# Patient Record
Sex: FEMALE | Race: WHITE | Employment: FULL TIME | URBAN - METROPOLITAN AREA
[De-identification: names, ages, dates, MRNs, and addresses within clinical notes are randomized per-mention and may not be internally consistent; named-entity substitution may affect disease eponyms.]

---

## 2020-09-18 ENCOUNTER — HOSPITAL ENCOUNTER (OUTPATIENT)
Dept: PHYSICAL THERAPY | Age: 35
Discharge: HOME | End: 2020-09-18
Payer: COMMERCIAL

## 2020-09-18 PROCEDURE — 97162 PT EVAL MOD COMPLEX 30 MIN: CPT

## 2020-09-18 PROCEDURE — 97110 THERAPEUTIC EXERCISES: CPT

## 2020-09-18 NOTE — PROGRESS NOTES
PT DAILY TREATMENT NOTE    Patient Name: Terri Howell  Date:2020  : 1985  [x]  Patient  Verified  Payor: Kim Pena / Plan: Dev Siddiqui RPN / Product Type: Commerical /    In time:10:56  Out time:11:41  Total Treatment Time (min): 45  Total Timed Codes (min): 25  1:1 Treatment Time ([facility] only): 25  Visit #: 1 of 12    Treatment Area: Back pain [M54.9]    SUBJECTIVE  Pain Level (0-10 scale): 5  Any medication changes, allergies to medications, adverse drug reactions, diagnosis change, or new procedure performed?: [x] No    [] Yes (see summary sheet for update)  Subjective functional status/changes:   [] No changes reported  Patient is a 30 y.o. Female who presents to [facility] with a dx of back pain s/p onset of pregancy and is due on 2020. Pt reports  symptoms began 3 months ago. Pt reports numbness and tingling in her back. Symptoms are worsened by prolonged sitting and alleviated by rest.  Average reported pain levels are 5/10, 7/10 at worst, and 4/10 at best. Pt is a mother of a 3year old and is having a boy. 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 Pt would benefit from skilled PT to address objective and functional limitations to improve ROM, strength, posture, and decrease pain to improve ability to perform ADLs at work/home. OBJECTIVE    Modalities Rationale:     decrease edema, decrease inflammation and decrease pain to improve patient's ability to stand for prolonged periods.     min [] Estim, type/location:                                      []  att     []  unatt     []  w/US     []  w/ice    []  w/heat    min []  Mechanical Traction: type/lbs                   []  pro   []  sup   []  int   []  cont    []  before manual    []  after manual    min []  Ultrasound, settings/location:      min []  Iontophoresis w/ dexamethasone, location:                                               []  take home patch []  in clinic    min []  Ice     []  Heat    location/position:     min []  Vasopneumatic Device, press/temp:     min []  Other:    [] Skin assessment post-treatment (if applicable):    []  intact    []  redness- no adverse reaction     []redness  adverse reaction:        20 min []Eval                  []Re-Eval       25 min Therapeutic Exercise:  [] See flow sheet :   Rationale: increase ROM and increase strength to improve the patients ability to stand for prolonged periods. With   [x] TE   [] TA   [] neuro   [] other: Patient Education: [x] Review HEP    [] Progressed/Changed HEP based on:   [] positioning   [] body mechanics   [] transfers   [] heat/ice application    [] other:      Other Objective/Functional Measures:    ROM     Right  Left    SB  3 inches from lateral femoral condlyle 3 inches from lateral femoral condlyle   Ext : 100%       Flex: 4 inches from floor        LTR: unable to test in supine secondary to being in 3rd trimester             MMT     Right Left    Hip flex 4 4+   Hip abd/add 4/4 4+/4+   Knee ext 4 4+   Ankle DF 4 4+   QP          SIJ cluster: unable to formally test, unable to lay supine     Sit to stand: pain with sit to stand improved after left adductor pull back          Pain Level (0-10 scale) post treatment: 4    ASSESSMENT/Changes in Function: Pt has s/sx consistent with sacroiliac joint pain. Patient will continue to benefit from skilled PT services to modify and progress therapeutic interventions, address functional mobility deficits, address ROM deficits and address strength deficits to attain remaining goals. []  See Plan of Care  []  See progress note/recertification  []  See Discharge Summary         Progress towards goals / Updated goals:  1. Patient to be (I) and compliant with Initial HEP to prevent further disability. Eval: dispensed       2.  Pt to increase standing and walking to 30 minutes  to improve functional tolerance  Eval: currently fatigued after 10 minutes     Long term Goals to be achieved in 6 weeks. 1. Pt to be (I) and compliant with progressive HEP in preparation for D/C. Eval: n/a      2. Pt to increase standing and walking to 1 hour  to improve functional tolerance. Eval: currently fatigued after 10 minutes     3.  Pt demonstrate good tolerance to QP positioning to improve lifting tolerance  Eval:challenged in modified QP    PLAN  []  Upgrade activities as tolerated     []  Continue plan of care  []  Update interventions per flow sheet       []  Discharge due to:_  []  Other:_      Carlotta Brooks, PT 9/18/2020  10:38 AM    Future Appointments   Date Time Provider [place]   9/18/2020 11:00 AM Uvaldo Aguirre, PT [facility]

## 2020-09-18 NOTE — PROGRESS NOTES
[facility] at the [address], [place], [address] [facility]  Phone: [phone]      Fax:  [phone]       Plan of Care/ Statement of Necessity for Physical Therapy Services      Patient name: Sushila Calhoun Start of Care: 2020   Referral source: Jarvis Sales : 1985    Medical Diagnosis: Back pain [M54.9]   Onset Date:acute    Treatment Diagnosis: Back pain [M54.9]   Prior Hospitalization: see medical history Provider#: [id number]   Medications: Verified on Patient summary List    Comorbidities: morbid obesity, pregnancy due on 2020, ACL repair left 2015, history of meniscus repair on right knee,    Prior Level of Function: no back pain prior to pregnancy    Patient is a 30 y.o. Female who presents to [facility] with a dx of back pain s/p onset of pregancy and is due on 2020. Pt reports  symptoms began 3 months ago. Pt reports numbness and tingling in her back. Symptoms are worsened by prolonged sitting and alleviated by rest.  Average reported pain levels are 5/10, 7/10 at worst, and 4/10 at best. Pt is a mother of a 3year old and is having a boy. 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 Pt would benefit from skilled PT to address objective and functional limitations to improve ROM, strength, posture, and decrease pain to improve ability to perform ADLs at work/home.      ROM    Right  Left    SB  3 inches from lateral femoral condlyle 3 inches from lateral femoral condlyle   Ext : 100%     Flex: 4 inches from floor      LTR: unable to test in supine secondary to being in 3rd trimester         MMT    Right Left    Hip flex 4 4+   Hip abd/add 4/4 4+/4+   Knee ext 4 4+   Ankle DF 4 4+   QP       SIJ cluster: unable to formally test, unable to lay supine    Sit to stand: pain with sit to stand improved after left adductor pull back      The Plan of Care and following information is based on the information from the initial evaluation. Assessment/ key information: Pt has s/sx consistent with sacroiliac joint pain. Evaluation Complexity History MEDIUM  Complexity : 1-2 comorbidities / personal factors will impact the outcome/ POC ; Examination HIGH Complexity : 4+ Standardized tests and measures addressing body structure, function, activity limitation and / or participation in recreation  ;Presentation MEDIUM Complexity : Evolving with changing characteristics  ; Clinical Decision Making MEDIUM Complexity : FOTO score of 26-74  Overall Complexity Rating: MEDIUM  Problem List: pain affecting function, decrease ROM, decrease strength, edema affecting function, impaired gait/ balance, and decrease ADL/ functional abilitiies   Treatment Plan may include any combination of the following: Therapeutic exercise, Therapeutic activities, Neuromuscular re-education, Physical agent/modality, and Manual therapy  Patient / Family readiness to learn indicated by: asking questions and interest  Persons(s) to be included in education: patient (P)  Barriers to Learning/Limitations: None  Patient Goal (s): get rid of back pain  Patient Self Reported Health Status: good  Rehabilitation Potential: good  Short term goals to be achieved in 3 weeks  1. Patient to be (I) and compliant with Initial HEP to prevent further disability. Eval: dispensed     2. Pt to increase standing and walking to 30 minutes  to improve functional tolerance  Eval: currently fatigued after 10 minutes     Long term Goals to be achieved in 6 weeks. 1. Pt to be (I) and compliant with progressive HEP in preparation for D/C. Eval: n/a     2. Pt to increase standing and walking to 1 hour  to improve functional tolerance. Eval: currently fatigued after 10 minutes    3.  Pt demonstrate good tolerance to QP positioning to improve lifting tolerance  Eval:challenged in modified QP    Frequency / Duration: Patient to be seen 2 times per week for 6 weeks. Patient/ Caregiver education and instruction: Diagnosis, prognosis, activity modification and exercises   [x]  Plan of care has been reviewed with PTA    Charlie Carrion, PT 9/18/2020 10:33 AM  _____________________________________________________________________  I certify that the above Therapy Services are being furnished while the patient is under my care. I agree with the treatment plan and certify that this therapy is necessary.     Physician's Signature:____________Date:_________TIME:________    [employer], Date and Time must be completed for valid certification **    Please sign and return to [facility] at the [address], [address] [facility]       Phone: [phone]      Fax:  [phone]

## 2020-09-25 ENCOUNTER — HOSPITAL ENCOUNTER (OUTPATIENT)
Dept: PHYSICAL THERAPY | Age: 35
Discharge: HOME | End: 2020-09-25
Payer: COMMERCIAL

## 2020-11-16 NOTE — PROGRESS NOTES
[facility] at the [address], [place], [address] [facility]  Phone: [phone]      Fax:  [phone]    Discharge Summary  Patient name: Chase Paredes Start of Care: 2020   Referral source: Christella Merlin* : 1985               Medical Diagnosis: Back pain [M54.9]    Onset Date:acute               Treatment Diagnosis: Back pain [M54.9]   Prior Hospitalization: see medical history Provider#: [id number]   Medications: Verified on Patient summary List    Comorbidities: morbid obesity, pregnancy due on 2020, ACL repair left , history of meniscus repair on right knee,    Prior Level of Function: no back pain prior to pregnancy    Visits from Start of Care: 1    Missed Visits: 0  1. Patient to be (I) and compliant with Initial HEP to prevent further disability. Eval: dispensed       2. Pt to increase standing and walking to 30 minutes  to improve functional tolerance  Eval: currently fatigued after 10 minutes     Long term Goals to be achieved in 6 weeks.   1. Pt to be (I) and compliant with progressive HEP in preparation for D/C. Eval: n/a      2. Pt to increase standing and walking to 1 hour  to improve functional tolerance. Eval: currently fatigued after 10 minutes     3. Pt demonstrate good tolerance to QP positioning to improve lifting tolerance  Eval:challenged in modified QP    Assessment/ Summary of Care: Pt only attended initial evaluation and is to be d/c'd at this time.    RECOMMENDATIONS:  [x]Discontinue therapy: []Patient has reached or is progressing toward set goals      []Patient is non-compliant or has abdicated      []Due to lack of appreciable progress towards set goals    Casi Wheat, PT 2020 1:54 PM

## 2021-11-11 ENCOUNTER — HOSPITAL ENCOUNTER (OUTPATIENT)
Dept: PHYSICAL THERAPY | Age: 36
Discharge: HOME | End: 2021-11-11
Payer: COMMERCIAL

## 2021-11-11 PROCEDURE — 97162 PT EVAL MOD COMPLEX 30 MIN: CPT

## 2021-11-11 PROCEDURE — 97110 THERAPEUTIC EXERCISES: CPT

## 2021-11-11 PROCEDURE — 97530 THERAPEUTIC ACTIVITIES: CPT

## 2021-11-11 NOTE — PROGRESS NOTES
PT DAILY TREATMENT NOTE/Knee Evaluation    Patient Name: Ramiro Dinero  Date:2021  : 1985  [x]  Patient  Verified  Payor: Leandro Lopez / Plan: Twan REAL / Product Type: Commerical /    In time:11:32  Out time:12:15  Total Treatment Time (min): 43  Total Timed Codes (min): 18  1:1 Treatment Time ( only): 18  Visit #: 1 of 16    Treatment Area: Pain in right knee [M25.561]    SUBJECTIVE  Any medication changes, allergies to medications, adverse drug reactions, diagnosis change, or new procedure performed?: [x] No    [] Yes (see summary sheet for update)  Hx Present Illness: Subjective: Pt reports that in August she slipped and fell on some water in kitchen. Reports that she ended up on the floor with left leg pulled back and the right knee straighten out. Reports has history of surgery in  for meniscus repair on the right and left ACL surgery and meniscal repair. Reports that she did have immediate swelling and had to wear a brace for a while. Reports went to [facility] Castillo who performed X-ray and stated nothing torn but possible strain. Reports MD ordered therapy, but she is just now starting therapy. Reports that she doesn't have strength in knees. Reports that she has had some buckling and the knee locks. Reports that she has some popping as well.      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, walking long distance, standing for 2 hrs    Previous treatment:   PT after surgeries    Co-morbidities: PMHx/Surgical Hx: [x]DM [] HTN (controlled) [] High cholesterol (controlled) [] Cancer [x]Other Right meniscal surgery, left: ACL and meniscal surgery, asthma,   Prior Level of Function:  functionally independent, no AD, walking for exercise   Social/Recreation/Work: Work Hx:   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    Diagnostic Testing:  [] Lab work [x] X-rays    [] CT [] MRI     [] Other:  Results: negative    Patient Goal(s): \"to get more movement, get legs strengthened\"    Barriers: [] none []pain []financial []time []transportation []other  Motivation: good  Substance use:[x] none  []Alcohol []Tobacco []other:   Cognition: A & O x 3    Objective:    Pt enters gym in no apparent distress    Posture: [] Poor    [x] Fair    [] Good    Describe: wide base of support, Right toe out, genu recurvatum R>left, decreased longitudinal arches, rounded sh, forward head, dowagers hump, increased lordosis, left>right rear foot eversion, left>right genu valgus    Outcome measure: FOTO  Score=38    Movement/gait:  [] Normal     [x] Abnormal:  Wide base of support, toe out, dec heel strike, dec toe off, unequal step length    Patellar Positioning (Static)   []Left []Right Normal []Left []Right Lateral   [x]Left [x]Right Wyvonne Pretty      []Left []Right Medial   []Left []Right [place]      Edema:   (centimeters) Right Left   2\" above mid patella     Mid patella 47 50   2\" below mid patella         Palpation: TTP:  TTP, right medial femoral condyle, right knee lateral joint line, feels tight on the lateral right knee    Patellar Mobility: WFL bilaterally []Left  []Right Hypermobile []Left []Right Hypomobile                           Hip Alignment: NT        ROM:  [] Unable to assess at this time     AROM             Knee Left Right   Extension 0 Lacking 7   Flexion 130 130         AROM                        Hip Left Right   Flexion 73 75 Extension     ABD     ER     IR       Strength:   [] Unable to assess at this time    Left (0-5) Right (0-5) N/T   Knee extension 4 5- []   Knee flexion 4+ 5- []   Ankle Dorsiflexion (L4) 4+ 4+ []   Ankle Plantarflexion (S1)  #heel raises   [x]   Hip flexion 4 4 []   Hip abduction 4- 4 []   Hip ADDuction   [x]   Hip ER 4 4- []   Hip IR 4 4 []   Hip extension 4- 4- []   Glute Max 3+ 3+ []   Glute Med   [x]              Balance: SLS: Left: 1\" Right: 2\"      Squat: inc anterior tibial translation, inc pronation, grinding audible on the right, dec lumbar flexion    Single leg squat:  NT    Neuro Screen [x] WNL Intact to light touch in bilateral LE.   Myotome/Dermatome/Reflexes:  Comments:    Special Test:  Knee  Left Right   Varus Stress Test  neg   Valgus Stress Test  neg   Lachman's  neg   Apprehension     Deshaun's  negative   Apley's test  Pain on the outside of the knee   Thessaly  negative   Posterior Sag     Patellar Grind  positive   Single Leg Squat     Anterior Drawer  negative   Single leg sit to stand     Khalif test ([employer])     Noble Compression test ([employer])       Hip Left Right   Gume Test     Emile Oglesby     90-90 20 23     25 min []Eval                  []Re-Eval       10 min Therapeutic Exercise:  [x] See flow sheet :   Rationale: increase ROM and increase strength to improve the patients ability to perform daily activities with decreased pain and symptom levels     8 min Therapeutic Activity:  [x]  See flow sheet :   Rationale: assess ROM and assess strength  to improve the patients ability to perform daily activities with decreased pain and symptom levels            With   [x] TE   [x] TA   [] neuro   [] other: Patient Education: [x] Review HEP    [] Progressed/Changed HEP based on:   [] positioning   [] body mechanics   [] transfers   [] heat/ice application    [x] other:  Educated pt on proper shoe ware and provided handout, educated pt on anatomy       Pain Level (0-10 scale) post treatment: 0/10    Assessment/ brody information:  Trell Hernandez is a 29 y.o.  yo female who [facility] to [facility] diagnosed with plica syndrome of the right knee. Patient is s/p fall this past August where she slipped on water and landed with left knee flexed behind her and right knee extended. Reports initially having swelling and required to wear a knee brace. Pt states that the knee kiet and locks sometimes and has some grinding and popping as well. Pt went to Dr. Kim Butler who stated possible strain as X-rays were negative and referred to PT. 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 Pt has relief with rest, Motrin, and moving it more. Pt rates pain 0/10 currently at rest and 2/10 at worst in the last 48 hrs (9/10 at the time of injury), and 0/10 at least. Patient demonstrates decreased ROM, decreased strength, impaired posture, impaired balance, and decreased functional mobility tolerance. Pt is tender to palpate right medial femoral condyle, right knee lateral joint line, reports it feels tight on the lateral right knee. Pt also presents with impaired squatting techniques. Pt with positive special test including Apley's compression test and patellar grinding. Pt signs and symptoms are consistent with possible meniscal involvement, as well as pelvic dysfunction. Pt would benefit from skilled physical therapy to address these limitations.     Patient will benefit from skilled PT services to modify and progress therapeutic interventions, address functional mobility deficits, address ROM deficits, address strength deficits, analyze and address soft tissue restrictions, analyze and cue movement patterns, analyze and modify body mechanics/ergonomics, assess and modify postural abnormalities, and address imbalance/dizziness to attain remaining goals. [x]  See Plan of Care  []  See progress note/recertification  []  See Discharge Summary    Evaluation Complexity History HIGH Complexity :3+ comorbidities / personal factors will impact the outcome/ POC ; Examination HIGH Complexity : 4+ Standardized tests and measures addressing body structure, function, activity limitation and / or participation in recreation  ;Presentation MEDIUM Complexity : Evolving with changing characteristics  ; Clinical Decision Making MEDIUM Complexity : FOTO score of 26-74 FOTO score = an established functional score where 100 = no disability  Overall Complexity Rating: MEDIUM  Problem List: pain affecting function, decrease ROM, decrease strength, edema affecting function, impaired gait/ balance, decrease ADL/ functional abilitiies, decrease activity tolerance, decrease flexibility/ joint mobility and decrease transfer abilities     Treatment Plan may include any combination of the following: Therapeutic exercise, Therapeutic activities, Neuromuscular re-education, Physical agent/modality, Gait/balance training, Manual therapy, Patient education, Self Care training, Functional mobility training, Home safety training and Stair training  Vasopnuematic compression justification:  Per bilateral girth measures taken and listed above the edema is considered significant and having an impact on the patient's n/a  Patient / Family readiness to learn indicated by: asking questions, trying to perform skills and interest  Persons(s) to be included in education: patient (P)  Barriers to Learning/Limitations: None  Patient Goal (s): \"to get more movement, get legs strengthened\"  Patient Self Reported Health Status: poor  Rehabilitation Potential: good    Short Term Goals: To be accomplished in 2 weeks: 1. Patient will report compliance with HEP 1x/day to aid in rehabilitation program.  Status at IE: Provided pt with HEP and pt appeared to understand. Current:Same as IE     2. Patient will rate pain on greater than 4/10 so pt can perform ADL's. Status at IE: Pt rates pain 0/10 currently at rest and 2/10 at worst in the last 48 hrs (9/10 at the time of injury), and 0/10 at least  Current: Same as IE    Long Term Goals: To be accomplished in 8 weeks: 1. Patient will increase limited Bilateral LE strength by at least . 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 Status at IE:   Left (0-5) Right (0-5) N/T   Knee extension 4 5- []   Knee flexion 4+ 5- []   Ankle Dorsiflexion (L4) 4+ 4+ []   Ankle Plantarflexion (S1)  #heel raises   [x]   Hip flexion 4 4 []   Hip abduction 4- 4 []   Hip ADDuction   [x]   Hip ER 4 4- []   Hip IR 4 4 []   Hip extension 4- 4- []   Glute Max 3+ 3+ []    Current: Same as IE    2. Patient will report pain no greater than 1-2/10 throughout entire day to aid in completion of ADLs. Status at IE:See STG  Current: Same as IE    3. 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 Status at IE:   Knee Left Right   Extension 0 Lacking 7   Flexion 130 130   Current: Same as IE    4. Patient will improve FOTO score to 63 points to demonstrate improvement in functional status. Status at IE:38  Current: Same as IE    *FOTO score is an established functional score where 100 = no disability*    5. Pt will be able to stand on one leg for 5 seconds to assist with shifting weight to one LE with gait. Status at IE: Balance: SLS: Left: 1\" Right: 2\"    Current: Same as IE    Frequency / Duration: Patient to be seen 2 times per week for 8  weeks.     PLAN  [x]  Upgrade activities as tolerated     []  Continue plan of care  [x]  Update interventions per flow sheet       []  Discharge due to:_  []  Other:_        Joanna Willson, PT, DPT, [facility] 11/11/2021  9:51 AM

## 2021-11-11 NOTE — PROGRESS NOTES
[facility] at the [address], [place], [address] [facility]  Phone: [phone]      Fax:  [phone]             Plan of Care/ Statement of Necessity for Physical Therapy Services      Patient name: Carolyn Rivera Start of Care: 2021   Referral source: Quynh Bynum MD : 1985    Medical Diagnosis: Pain in right knee [M25.561]   Onset Date:2021    Treatment Diagnosis: Right knee pain   Prior Hospitalization: see medical history Provider#: [id number]   Medications: Verified on Patient summary List    Co-morbidities: PMHx/Surgical Hx: [x]? DM []? HTN (controlled) []? High cholesterol (controlled) []? Cancer [x]? Other Right meniscal surgery, left: ACL and meniscal surgery, asthma,   Prior Level of Function:  functionally independent, no AD, walking for exercise       The Plan of Care and following information is based on the information from the initial evaluation. Assessment/ key information: Corby Duff is a 29 y.o.  yo female who [facility] to [facility] diagnosed with plica syndrome of the right knee. Patient is s/p fall this past August where she slipped on water and landed with left knee flexed behind her and right knee extended. Reports initially having swelling and required to wear a knee brace. Pt states that the knee kiet and locks sometimes and has some grinding and popping as well. Pt went to Dr. Nancy Mckay who stated possible strain as X-rays were negative and referred to PT. 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 Pt has relief with rest, Motrin, and moving it more.  Pt rates pain 0/10 currently at rest and 2/10 at worst in the last 48 hrs (9/10 at the time of injury), and 0/10 at least. Patient demonstrates decreased ROM, decreased strength, impaired posture, impaired balance, and decreased functional mobility tolerance. Pt is tender to palpate right medial femoral condyle, right knee lateral joint line, reports it feels tight on the lateral right knee. Pt also presents with impaired squatting techniques. Pt with positive special test including Apley's compression test and patellar grinding. Pt signs and symptoms are consistent with possible meniscal involvement, as well as pelvic dysfunction.  Pt would benefit from skilled physical therapy to address these limitations.     Patient will benefit from skilled PT services to modify and progress therapeutic interventions, address functional mobility deficits, address ROM deficits, address strength deficits, analyze and address soft tissue restrictions, analyze and cue movement patterns, analyze and modify body mechanics/ergonomics, assess and modify postural abnormalities, and address imbalance/dizziness to attain remaining goals.          Evaluation Complexity History HIGH Complexity :3+ comorbidities / personal factors will impact the outcome/ POC ; Examination HIGH Complexity : 4+ Standardized tests and measures addressing body structure, function, activity limitation and / or participation in recreation  ;Presentation MEDIUM Complexity : Evolving with changing characteristics  ;Clinical Decision Making MEDIUM Complexity : FOTO score of 26-74 FOTO score = an established functional score where 100 = no disability  Overall Complexity Rating: MEDIUM  Problem List: pain affecting function, decrease ROM, decrease strength, edema affecting function, impaired gait/ balance, decrease ADL/ functional abilitiies, decrease activity tolerance, decrease flexibility/ joint mobility and decrease transfer abilities     Treatment Plan may include any combination of the following: Therapeutic exercise, Therapeutic activities, Neuromuscular re-education, Physical agent/modality, Gait/balance training, Manual therapy, Patient education, Self Care training, Functional mobility training, Home safety training and Stair training  Vasopnuematic compression justification:  Per bilateral girth measures taken and listed above the edema is considered significant and having an impact on the patient's n/a  Patient / Family readiness to learn indicated by: asking questions, trying to perform skills and interest  Persons(s) to be included in education: patient (P)  Barriers to Learning/Limitations: None  Patient Goal (s): \"to get more movement, get legs strengthened\"  Patient Self Reported Health Status: poor  Rehabilitation Potential: good     Short Term Goals: To be accomplished in 2 weeks: 1. Patient will report compliance with HEP 1x/day to aid in rehabilitation program.  Status at IE: Provided pt with HEP and pt appeared to understand. Current:Same as IE     2. Patient will rate pain on greater than 4/10 so pt can perform ADL's. Status at IE: Pt rates pain 0/10 currently at rest and 2/10 at worst in the last 48 hrs (9/10 at the time of injury), and 0/10 at least  Current: Same as IE     Long Term Goals: To be accomplished in 8 weeks: 1. Patient will increase limited Bilateral LE strength by at least . 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    Knee flexion 4+ 5- []?    Ankle Dorsiflexion (L4) 4+ 4+ []?    Ankle Plantarflexion (S1)  #heel raises     [x]?    Hip flexion 4 4 []?    Hip abduction 4- 4 []?    Hip ADDuction     [x]?    Hip ER 4 4- []?    Hip IR 4 4 []?    Hip extension 4- 4- []?    Glute Max 3+ 3+ []?     Current: Same as IE     2.Patient will report pain no greater than 1-2/10 throughout entire day to aid in completion of ADLs. Status at IE:See STG  Current: Same as IE     3.  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 Status at IE:   Knee Left Right   Extension 0 Lacking 7   Flexion 130 130   Current: Same as IE     4.Patient will improve FOTO score to 63 points to demonstrate improvement in functional status. Status at IE:38  Current: Same as IE     *FOTO score is an established functional score where 100 = no disability*     5. Pt will be able to stand on one leg for 5 seconds to assist with shifting weight to one LE with gait. Status at IE: Balance: SLS: Left: 1\" Right: 2\"    Current: Same as IE     Frequency / Duration: Patient to be seen 2 times per week for 8  weeks. Patient/ Caregiver education and instruction: Diagnosis, prognosis, self care, activity modification and exercises Educated pt on proper shoe ware and provided handout, educated pt on anatomy   [x]  Plan of care has been reviewed with PTA    Certification Period: n/a  Brittany Steele PT, DPT, CIMT 11/11/2021 9:52 AM  _____________________________________________________________________  I certify that the above Therapy Services are being furnished while the patient is under my care. I agree with the treatment plan and certify that this therapy is necessary.     [de-identified] Signature:____________Date:_________TIME:________                                      Brionna Fish MD    ** Signature, Date and Time must be completed for valid certification **    Please sign and return to [facility] at the [address], [facility], [address] Counts include 234 beds at the [facility]       Phone: [phone]      Fax:  [phone]

## 2021-11-17 ENCOUNTER — APPOINTMENT (OUTPATIENT)
Dept: PHYSICAL THERAPY | Age: 36
End: 2021-11-17
Payer: COMMERCIAL

## 2021-11-22 ENCOUNTER — HOSPITAL ENCOUNTER (OUTPATIENT)
Dept: PHYSICAL THERAPY | Age: 36
Discharge: HOME | End: 2021-11-22
Payer: COMMERCIAL

## 2021-11-22 PROCEDURE — 97110 THERAPEUTIC EXERCISES: CPT

## 2021-11-22 PROCEDURE — 97112 NEUROMUSCULAR REEDUCATION: CPT

## 2021-11-22 PROCEDURE — 97530 THERAPEUTIC ACTIVITIES: CPT

## 2021-11-22 NOTE — PROGRESS NOTES
PT DAILY TREATMENT NOTE    Patient Name: Rosemarie Stephenson  Date:2021  : 1985  [x]  Patient  Verified  Payor: Justine Side / Plan: Deja Talley RPN / Product Type: Commerical /    In time:2:20  Out time:3:13  Total Treatment Time (min): 53  Total Timed Codes (min): 43  1:1 Treatment Time (MC/BCBS only): 43   Visit #: 2 of 16    Treatment Dx: Pain in right knee [M25.561]    SUBJECTIVE  Pain Level (0-10 scale): 6/10  Any medication changes, allergies to medications, adverse drug reactions, diagnosis change, or new procedure performed?: [] No    [x] Yes (see summary sheet for update)- Pt reports that she is not allergic Prednisone  Subjective functional status/changes:   [] No changes reported  Pt reports that she is feeling it in the calf muscle more. 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 Reports that she is doing HEP 1x/day. 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 Reports she forgot to wear tennis shoes today (pt wearing boots today).      OBJECTIVE pt enters gym in no apparent distress    Modalities Rationale:     decrease edema, decrease inflammation and decrease pain to improve patient's ability to perform daily activities with decreased pain and symptom levels     min [] Estim, type/location:                                      []  att     []  unatt     []  w/US     []  w/ice    []  w/heat    min []  Mechanical Traction: type/lbs                   []  pro   []  sup   []  int   []  cont    []  before manual    []  after manual    min []  Ultrasound, settings/location:      min []  Iontophoresis w/ dexamethasone, location:                                               []  take home patch       []  in clinic   10 min [x]  Ice     []  Heat    location/position: Right knee, pt reclined with bolster under legs    min []  Vasopneumatic Device, press/temp:    If using vaso (only need to measure limb vaso being performed on)      pre-treatment girth :       post-treatment girth :       measured at (landmark location) :      min []  Other:    [] Skin assessment post-treatment (if applicable):    []  intact    []  redness- no adverse reaction                  []redness  adverse reaction:            23 min Therapeutic Exercise:  [] See flow sheet :   Rationale: increase ROM, increase strength, improve coordination, improve balance and increase proprioception to improve the patients ability to perform daily activities with decreased pain and symptom levels      10 min Therapeutic Activity:  []  See flow sheet :   Rationale: increase ROM, increase strength, improve coordination, improve balance and increase proprioception  to improve the patients ability to perform daily activities with decreased pain and symptom levels       10 min Neuromuscular Re-education:  []  See flow sheet :   Rationale: increase ROM, increase strength, improve coordination, improve balance and increase proprioception  to improve the patients ability to perform daily activities with decreased pain and symptom levels            With   [x] TE   [x] TA   [x] neuro   [] other: Patient Education: [x] Review HEP    [] Progressed/Changed HEP based on:   [x] positioning   [x] body mechanics   [x] transfers   [] heat/ice application    [x] other: proper shoe ware for therapy, proper gait     Other Objective/Functional Measures: please see below     Pain Level (0-10 scale) post treatment: \"better\"    ASSESSMENT/Changes in Function: Patient tolerated therapy session well as there were no adverse reactions today. Pt had a hard time with performing PPT and required tactile cuing. Pt required tactile cuing to keep PPT with sidelying glut max and adductor pull back. Pt is progressing with therapy as indicated by pt tolerating increase in exercise repetitions and resistance.  Although showing progress patient would benefit from continuation of skilled physical therapy to address the remaining limitations. Patient will continue to benefit from skilled PT services to modify and progress therapeutic interventions, address functional mobility deficits, address ROM deficits, address strength deficits, analyze and address soft tissue restrictions, analyze and cue movement patterns, analyze and modify body mechanics/ergonomics, assess and modify postural abnormalities, address imbalance/dizziness and instruct in home and community integration to attain remaining goals. [x]  See Plan of Care  []  See progress note/recertification  []  See Discharge Summary         Progress towards goals / Updated goals:  Short Term Goals:   To be accomplished in 2 weeks: 1. Patient will report compliance with HEP 1x/day to aid in rehabilitation program.  Status at IE: Provided pt with HEP and pt appeared to understand. Current:11/22/21 Pt reports she is doing HEP 1x/week for 1 week     2.Patient will rate pain on greater than 4/10 so pt can perform ADL's. Status at IE: Pt rates pain 0/10 currently at rest and 2/10 at worst in the last 48 hrs (9/10 at the time of injury), and 0/10 at least  Current: 11/22/21 rates pain 6/10 this therapy session-progressing     Long Term Goals: To be accomplished in 8 weeks: 1. Patient will increase limited Bilateral LE strength by at least . 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    Knee flexion 4+ 5- []??    Ankle Dorsiflexion (L4) 4+ 4+ []? ?    Ankle Plantarflexion (S1)  #heel raises     [x]? ?    Hip flexion 4 4 []? ?    Hip abduction 4- 4 []? ?    Hip ADDuction     [x]? ?    Hip ER 4 4- []??    Hip IR 4 4 []? ?    Hip extension 4- 4- []??    Glute Max 3+ 3+ []? ?     Current: Same as IE     2.Patient will report pain no greater than 1-2/10 throughout entire day to aid in completion of ADLs.   Status at IE:See STG  Current: Same as IE     3. 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 Status at IE:   Knee Left Right   Extension 0 Lacking 7   Flexion 130 130   Current: Same as IE     4.Patient will improve FOTO score to 63 points to demonstrate improvement in functional status. Status at IE:38  Current: 11/22/21 will perform on 5th visit     *FOTO score is an established functional score where 100 = no disability*     5. Pt will be able to stand on one leg for 5 seconds to assist with shifting weight to one LE with gait.   Status at IE: Balance: SLS: Left: 1\" Right: 2\"    Current: Same as IE        PLAN  [x]  Upgrade activities as tolerated     [x]  Continue plan of care  []  Update interventions per flow sheet       []  Discharge due to:_  []  Other:_      Tiffany Yusuf, PT, DPT, CIMT 11/22/2021  10:54 AM    Future Appointments   Date Time Provider [place]   11/22/2021  2:15 PM Wayne Perfect, PT [facility]   11/24/2021  1:30 PM Wayne Perfect, PT [facility]   12/1/2021  2:15 PM Wayne Perfect, PT [facility]   12/3/2021 10:45 AM Wayne Perfect, PT [facility]   12/8/2021  2:15 PM Wayne Perfect, PT [facility]   12/10/2021 11:30 AM Wayne Perfect, PT [facility]   12/13/2021 10:45 AM Wayne Perfect, PT [facility]   12/15/2021 10:45 AM Wayne Perfect, PT [facility]   12/21/2021  9:15 AM Wayne Perfect, PT [facility]   12/23/2021 10:45 AM Wayne Perfect, PT [facility]

## 2021-11-24 ENCOUNTER — HOSPITAL ENCOUNTER (OUTPATIENT)
Dept: PHYSICAL THERAPY | Age: 36
End: 2021-11-24
Payer: COMMERCIAL

## 2021-12-01 ENCOUNTER — APPOINTMENT (OUTPATIENT)
Dept: PHYSICAL THERAPY | Age: 36
End: 2021-12-01

## 2021-12-08 ENCOUNTER — HOSPITAL ENCOUNTER (OUTPATIENT)
Dept: PHYSICAL THERAPY | Age: 36
End: 2021-12-08

## 2021-12-10 ENCOUNTER — HOSPITAL ENCOUNTER (OUTPATIENT)
Dept: PHYSICAL THERAPY | Age: 36
Discharge: HOME | End: 2021-12-10

## 2021-12-10 NOTE — PROGRESS NOTES
[facility]  [address], [place], [address]  Tel. [phone]  Fax: [phone]    Physical Therapy Discharge Summary    Patient Name: Sanjay Morgan : 1985   Treatment/Medical Diagnosis: Pain in right knee [M25.561]   Referral Source: Aylin Walters MD     Date of Initial Visit: 21 Attended Visits: 2 Missed Visits: 6       SUMMARY OF TREATMENT  Pt attended only initial evaluation and     1     follow-ups with the last attended visit on 21 and then did not return. Therefore a formal reassessment of goals was not performed. Therapist called pt on 12/10/21 and pt stated now was not a good time for therapy as she is busy with work and caring for her son. Pt requested to be d/c'd at this time. Educated pt that if she needs to return then we would need a new order from the MD and perform another evaluation. RECOMMENDATIONS  Discontinue physical therapy due to patient not returning. Pt shall remain under care of MD.      If you have any questions/comments please contact us directly at [phone]. Thank you for allowing us to assist in the care of your patient.     Therapist Signature: Kai Toledo PT, DPT, CIMT Date: 12/10/21     Time: 1:22 PM

## 2021-12-13 ENCOUNTER — APPOINTMENT (OUTPATIENT)
Dept: PHYSICAL THERAPY | Age: 36
End: 2021-12-13

## 2021-12-15 ENCOUNTER — APPOINTMENT (OUTPATIENT)
Dept: PHYSICAL THERAPY | Age: 36
End: 2021-12-15

## 2021-12-21 ENCOUNTER — APPOINTMENT (OUTPATIENT)
Dept: PHYSICAL THERAPY | Age: 36
End: 2021-12-21

## 2021-12-23 ENCOUNTER — APPOINTMENT (OUTPATIENT)
Dept: PHYSICAL THERAPY | Age: 36
End: 2021-12-23

## 2024-04-22 ENCOUNTER — OFFICE VISIT (OUTPATIENT)
Age: 39
End: 2024-04-22

## 2024-04-22 ENCOUNTER — HOSPITAL ENCOUNTER (OUTPATIENT)
Facility: HOSPITAL | Age: 39
Setting detail: SPECIMEN
Discharge: HOME | End: 2024-04-25

## 2024-04-22 VITALS
DIASTOLIC BLOOD PRESSURE: 86 MMHG | TEMPERATURE: 97.5 F | SYSTOLIC BLOOD PRESSURE: 138 MMHG | BODY MASS INDEX: 48.82 KG/M2 | WEIGHT: 293 LBS | HEIGHT: 65 IN | HEART RATE: 70 BPM | RESPIRATION RATE: 18 BRPM | OXYGEN SATURATION: 96 %

## 2024-04-22 DIAGNOSIS — E66.01 MORBID OBESITY (HCC): Primary | ICD-10-CM

## 2024-04-22 DIAGNOSIS — N32.81 OVERACTIVE BLADDER: ICD-10-CM

## 2024-04-22 DIAGNOSIS — E28.2 PCOS (POLYCYSTIC OVARIAN SYNDROME): ICD-10-CM

## 2024-04-22 DIAGNOSIS — Z71.89 ENCOUNTER FOR PRE-BARIATRIC SURGERY COUNSELING AND EDUCATION: ICD-10-CM

## 2024-04-22 DIAGNOSIS — F41.1 GAD (GENERALIZED ANXIETY DISORDER): ICD-10-CM

## 2024-04-22 DIAGNOSIS — E11.65 TYPE 2 DIABETES MELLITUS WITH HYPERGLYCEMIA, WITHOUT LONG-TERM CURRENT USE OF INSULIN (HCC): ICD-10-CM

## 2024-04-22 DIAGNOSIS — K21.9 GASTROESOPHAGEAL REFLUX DISEASE, UNSPECIFIED WHETHER ESOPHAGITIS PRESENT: ICD-10-CM

## 2024-04-22 LAB — LABCORP SPECIMEN COLLECTION: NORMAL

## 2024-04-22 PROCEDURE — 99001 SPECIMEN HANDLING PT-LAB: CPT

## 2024-04-22 PROCEDURE — 99204 OFFICE O/P NEW MOD 45 MIN: CPT | Performed by: SURGERY

## 2024-04-22 RX ORDER — SEMAGLUTIDE 2.68 MG/ML
1 INJECTION, SOLUTION SUBCUTANEOUS
COMMUNITY
Start: 2024-01-16

## 2024-04-22 RX ORDER — ALBUTEROL SULFATE 90 UG/1
2 AEROSOL, METERED RESPIRATORY (INHALATION)
COMMUNITY
Start: 2022-04-04

## 2024-04-22 RX ORDER — TRAZODONE HYDROCHLORIDE 100 MG/1
100 TABLET ORAL NIGHTLY PRN
COMMUNITY
Start: 2023-10-11

## 2024-04-22 RX ORDER — FERROUS SULFATE 325(65) MG
325 TABLET ORAL DAILY
COMMUNITY
Start: 2024-03-21

## 2024-04-22 RX ORDER — MELOXICAM 15 MG/1
15 TABLET ORAL DAILY
COMMUNITY
Start: 2024-04-18

## 2024-04-22 NOTE — PROGRESS NOTES
Bariatric Surgery Consultation    CC: Consultation regarding surgical treatment options for morbid obesity and related comorbidities  Subjective:     The patient is a 38 y.o. obese  female with a Body mass index is 56.25 kg/m².. They have been considering surgery for some time now. The patient presents to the clinic today to discuss surgical weight loss options. They have made multiple attempts at weight loss over the years without success. They have tried low-carb, low calorie, high-protein diets, ozempic. Unfortunately, none of these have lead to meaningful, sustained weight loss. They have attended the bariatric seminar before the visit.     DIET:   See intake form for diet recall    Denies nausea, vomiting, dysphagia  Rare reflux    EXERCISE:   Walking regimen, see intake form    STOP-BANG score:  3    Cancer Screenings:  No cervical cancer screening on file   No breast cancer screening on file   No colonoscopy on file  No cologuard on file  No FIT/FOBT on file   No flexible sigmoidoscopy on file     Labs:   No results found for: \"WBC\", \"HGB\", \"HCT\", \"MCV\", \"PLT\"   No results found for: \"NA\", \"K\", \"CL\", \"CO2\", \"BUN\", \"CREATININE\", \"GLUCOSE\", \"CALCIUM\", \"PROT\", \"LABALBU\", \"BILITOT\", \"ALKPHOS\", \"AST\", \"ALT\", \"LABGLOM\", \"GFRAA\", \"AGRATIO\", \"GLOB\"   No results found for: \"IRON\", \"TIBC\", \"FERRITIN\"   No results found for: \"TSH\", \"TSHREFLEX\", \"TSHFT4\", \"TSHELE\", \"YFY5VBH\", \"TSHHS\"   No results found for: \"LABA1C\"   No results found for: \"LTG9TVHX\"   No results found for: \"VITD25\"    No results found for: \"FMFPSEVU72\"   No results found for: \"AGRP7MBHGOJ\"  No results found for: \"CHOL\", \"TRIG\", \"HDL\", \"LDLCHOLESTEROL\", \"LDLCALC\", \"VLDL\", \"CHOLHDLRATIO\"           Patient Active Problem List    Diagnosis Date Noted    NEHEMIAS (generalized anxiety disorder) 07/14/2021    Type 2 diabetes mellitus with hyperglycemia, without long-term current use of insulin (HCC) 07/14/2021    PCOS (polycystic ovarian syndrome)

## 2024-04-22 NOTE — PROGRESS NOTES
Chief Complaint   Patient presents with    Surgical Consult     Confirmed video    Pt ID confirmed        4/22/2024    10:34 AM   Ambulatory Bariatric Summary   Systolic 154   Diastolic 86   Temp 97.5 °F (36.4 °C)   Respirations 18   Weight - Scale 338   Height 1.651 m (5' 5\")   BMI 56.4 kg/m2   Weight - Scale 153.3 kg (338 lb)   BMI (Calculated) 56.4       Body mass index is 56.25 kg/m².

## 2024-04-25 LAB — UREA BREATH TEST QL: NEGATIVE

## 2024-04-26 LAB — SPECIMEN STATUS REPORT: NORMAL

## 2024-05-01 ENCOUNTER — HOSPITAL ENCOUNTER (OUTPATIENT)
Facility: HOSPITAL | Age: 39
Discharge: HOME | End: 2024-05-04

## 2024-05-01 ENCOUNTER — CLINICAL DOCUMENTATION (OUTPATIENT)
Facility: HOSPITAL | Age: 39
End: 2024-05-01

## 2024-05-01 NOTE — PROGRESS NOTES
[facility]  [address], [address]    Patient's Name: Genevieve Castillo   Age: 38 y.o.  YOB: 1985   Sex: female                Session: 1 of  3   Surgeon:  Dr. Cooper    Height: 5 f 5   Weight:    338      Lbs.     BMI: 56   Pounds Lost since last month: 0                 Pounds Gained since last month: 0    Starting Weight: 338     Previous Month’s Weight: 338  Overall Pounds Lost: 0   Overall Pounds Gained: 0    Patient has not been to support group.    Do you smoke: None    Alcohol intake:  Number of drinks at a time:  None  Number of times a week: None    Class Guidelines    Guidelines are reviewed with patient at the start of every class.    1. Patient understands that weight loss trial classes must be consecutive.  Patient understands if they miss a class, it is their responsibility to contact me to reschedule class.  I will reach out to patient after their first no show.  2.  Patient understands the expectations that weight maintenance/weight loss is expected during the classes.  Failure to demonstrate changes may result in one extra month of weight loss trial, followed by going back to see the surgeon.  Patient understands that they CAN NOT gain any weight during the weight loss trial.  Gaining weight will result in extra classes.  3. Patient is also instructed to be doing their labs, blood work, psych visit, support group and any other test that the surgeon has used while they are working on their weight loss trial.  4.  Patient was instructed to bring their blue binder to every class and appointment.      Other Pertinent Information:     Changes Made Since Last Class: Trying to add in more protein    Eating Habits and Behaviors      Today in class we talked about the key diet principles.  We start off each class talking about these principles, which include cutting out liquid calories and focusing on water or other

## 2024-05-28 ENCOUNTER — HOSPITAL ENCOUNTER (OUTPATIENT)
Facility: HOSPITAL | Age: 39
Discharge: HOME | End: 2024-05-31
Attending: SURGERY
Payer: COMMERCIAL

## 2024-05-28 ENCOUNTER — HOSPITAL ENCOUNTER (OUTPATIENT)
Facility: HOSPITAL | Age: 39
Discharge: HOME | End: 2024-05-31

## 2024-05-28 DIAGNOSIS — Z71.89 ENCOUNTER FOR PRE-BARIATRIC SURGERY COUNSELING AND EDUCATION: ICD-10-CM

## 2024-05-28 DIAGNOSIS — E11.65 TYPE 2 DIABETES MELLITUS WITH HYPERGLYCEMIA, WITHOUT LONG-TERM CURRENT USE OF INSULIN (HCC): ICD-10-CM

## 2024-05-28 DIAGNOSIS — E66.01 MORBID OBESITY (HCC): ICD-10-CM

## 2024-05-28 DIAGNOSIS — K21.9 GASTROESOPHAGEAL REFLUX DISEASE, UNSPECIFIED WHETHER ESOPHAGITIS PRESENT: ICD-10-CM

## 2024-05-28 DIAGNOSIS — F41.1 GAD (GENERALIZED ANXIETY DISORDER): ICD-10-CM

## 2024-05-28 DIAGNOSIS — N32.81 OVERACTIVE BLADDER: ICD-10-CM

## 2024-05-28 DIAGNOSIS — E28.2 PCOS (POLYCYSTIC OVARIAN SYNDROME): ICD-10-CM

## 2024-05-28 LAB
EKG ATRIAL RATE: 69 BPM
EKG DIAGNOSIS: NORMAL
EKG P AXIS: 0 DEGREES
EKG P-R INTERVAL: 154 MS
EKG Q-T INTERVAL: 406 MS
EKG QRS DURATION: 88 MS
EKG QTC CALCULATION (BAZETT): 435 MS
EKG R AXIS: 63 DEGREES
EKG T AXIS: 21 DEGREES
EKG VENTRICULAR RATE: 69 BPM
SENTARA SPECIMEN COLLECTION: NORMAL

## 2024-05-28 PROCEDURE — 99001 SPECIMEN HANDLING PT-LAB: CPT

## 2024-05-28 PROCEDURE — 6370000000 HC RX 637 (ALT 250 FOR IP): Performed by: SURGERY

## 2024-05-28 PROCEDURE — 2500000003 HC RX 250 WO HCPCS: Performed by: SURGERY

## 2024-05-28 PROCEDURE — 93005 ELECTROCARDIOGRAM TRACING: CPT

## 2024-05-28 PROCEDURE — 74246 X-RAY XM UPR GI TRC 2CNTRST: CPT

## 2024-05-28 RX ADMIN — ANTACID/ANTIFLATULENT 1 EACH: 380; 550; 10; 10 GRANULE, EFFERVESCENT ORAL at 09:57

## 2024-05-28 RX ADMIN — BARIUM SULFATE 135 ML: 980 POWDER, FOR SUSPENSION ORAL at 09:56

## 2024-05-28 RX ADMIN — BARIUM SULFATE 1 TABLET: 700 TABLET ORAL at 09:57

## 2024-05-28 RX ADMIN — BARIUM SULFATE 176 G: 960 POWDER, FOR SUSPENSION ORAL at 09:56

## 2024-05-29 LAB
A/G RATIO: 1.8 RATIO (ref 1.1–2.6)
ALBUMIN: 4.3 G/DL (ref 3.5–5)
ALP BLD-CCNC: 82 U/L (ref 25–115)
ALT SERPL-CCNC: 22 U/L (ref 5–40)
ANION GAP SERPL CALCULATED.3IONS-SCNC: 14 MMOL/L (ref 3–15)
AST SERPL-CCNC: 15 U/L (ref 10–37)
BASOPHILS ABSOLUTE: 0.1 K/UL (ref 0–0.2)
BASOPHILS RELATIVE PERCENT: 1 % (ref 0–2)
BILIRUB SERPL-MCNC: 0.2 MG/DL (ref 0.2–1.2)
BUN BLDV-MCNC: 18 MG/DL (ref 6–22)
CALCIUM SERPL-MCNC: 9.4 MG/DL (ref 8.4–10.5)
CHLORIDE BLD-SCNC: 99 MMOL/L (ref 98–110)
CO2: 25 MMOL/L (ref 20–32)
CREAT SERPL-MCNC: 0.6 MG/DL (ref 0.5–1.2)
EOSINOPHIL # BLD: 3 % (ref 0–6)
EOSINOPHILS ABSOLUTE: 0.3 K/UL (ref 0–0.5)
FOLATE: 5.2 NG/ML
GFR, ESTIMATED: >60 ML/MIN/1.73 SQ.M.
GLOBULIN: 2.4 G/DL (ref 2–4)
GLUCOSE: 98 MG/DL (ref 70–99)
HCT VFR BLD CALC: 37.8 % (ref 35.1–46.5)
HEMOGLOBIN: 11.6 G/DL (ref 11.7–15.5)
LYMPHOCYTES # BLD: 29 % (ref 20–45)
LYMPHOCYTES ABSOLUTE: 2.5 K/UL (ref 1–4.8)
MCH RBC QN AUTO: 29 PG (ref 26–34)
MCHC RBC AUTO-ENTMCNC: 31 G/DL (ref 31–36)
MCV RBC AUTO: 94 FL (ref 80–99)
MONOCYTES ABSOLUTE: 0.5 K/UL (ref 0.1–1)
MONOCYTES: 6 % (ref 3–12)
NEUTROPHILS ABSOLUTE: 5.3 K/UL (ref 1.8–7.7)
NEUTROPHILS: 62 % (ref 40–75)
PDW BLD-RTO: 14.7 % (ref 10–15.5)
PLATELET # BLD: 427 K/UL (ref 140–440)
PMV BLD AUTO: 11 FL (ref 9–13)
POTASSIUM SERPL-SCNC: 4.8 MMOL/L (ref 3.5–5.5)
RBC # BLD: 4.04 M/UL (ref 3.8–5.2)
SODIUM BLD-SCNC: 138 MMOL/L (ref 133–145)
TOTAL PROTEIN: 6.7 G/DL (ref 6.4–8.3)
TSH SERPL DL<=0.05 MIU/L-ACNC: 1.63 MCU/ML (ref 0.27–4.2)
VITAMIN B-12: 464 PG/ML (ref 211–911)
VITAMIN D 25-HYDROXY: 21.9 NG/ML (ref 32–100)
WBC # BLD: 8.6 K/UL (ref 4–11)

## 2024-05-31 ENCOUNTER — TELEPHONE (OUTPATIENT)
Age: 39
End: 2024-05-31

## 2024-05-31 NOTE — TELEPHONE ENCOUNTER
----- Message from Kenji Cooper MD sent at 5/31/2024  7:55 AM EDT -----  Please notify patient of vitamin D deficiency/insufficiency and that I sent a script to the pharmacy on file. Thank you!

## 2024-06-01 LAB — THIAMINE BLOOD: 118 NMOL/L (ref 78–185)

## 2024-06-05 ENCOUNTER — CLINICAL DOCUMENTATION (OUTPATIENT)
Facility: HOSPITAL | Age: 39
End: 2024-06-05

## 2024-06-05 ENCOUNTER — HOSPITAL ENCOUNTER (OUTPATIENT)
Facility: HOSPITAL | Age: 39
Discharge: HOME | End: 2024-06-08

## 2024-06-05 NOTE — PROGRESS NOTES
[facility]  [address], [address]    Patient's Name: Genevieve Castillo     Age: 38 y.o.  YOB: 1985     Sex: female                Session: 2 of  3   Surgeon:  Dr. Cooper    Height: 5 f 5   Weight:    341      Lbs.     BMI:    Pounds Lost since last month: 0                 Pounds Gained since last month: 3    Starting Weight: 338     Previous Month’s Weight: 338  Overall Pounds Lost: 0   Overall Pounds Gained: 3    Patient has been to support group.    Do you nicotine products:  None    Alcohol intake:  Number of drinks at a time:  None  Number of times a week: None      Class Guidelines  Office Use Only:  IP  Guidelines are reviewed with patient at the start of every class.    1. Patient understands that weight loss trial classes must be consecutive.  Patient understands if they have 3 no shows including in person and virtual visits, they will be removed from the program.  2.  Patient understands the expectations that weight maintenance/weight loss is expected during the classes.  Failure to demonstrate changes may result in one extra month of weight loss trial, followed by a visit with the nurse practitioner.  3. Patient is also instructed to be doing their labs, blood work, psych visit, support group and any other test that the surgeon has ordered while they are working on their weight loss trial.    Other Pertinent Information:     Changes Made Since Last Class: Adding in a protein drink in the morning    Eating Habits and Behaviors      Today in class we talked about the key diet principles.  We start off each class talking about these principles, which include cutting out liquid calories and focusing on water or other non-calorie, non-carbonated drinks.  We also spent time talking about carbohydrates, including foods that have carbohydrates and the goal to keep daily carbohydrates under 100 grams per day.  Patient was given

## 2024-06-13 ENCOUNTER — OFFICE VISIT (OUTPATIENT)
Age: 39
End: 2024-06-13

## 2024-06-13 VITALS
TEMPERATURE: 97.9 F | WEIGHT: 293 LBS | HEART RATE: 89 BPM | DIASTOLIC BLOOD PRESSURE: 87 MMHG | SYSTOLIC BLOOD PRESSURE: 133 MMHG | OXYGEN SATURATION: 99 % | HEIGHT: 65 IN | RESPIRATION RATE: 18 BRPM | BODY MASS INDEX: 48.82 KG/M2

## 2024-06-13 DIAGNOSIS — E66.01 MORBID OBESITY (HCC): ICD-10-CM

## 2024-06-13 DIAGNOSIS — Z71.89 ENCOUNTER FOR PRE-BARIATRIC SURGERY COUNSELING AND EDUCATION: Primary | ICD-10-CM

## 2024-06-13 RX ORDER — SEMAGLUTIDE 1.34 MG/ML
INJECTION, SOLUTION SUBCUTANEOUS
COMMUNITY
Start: 2024-04-22 | End: 2024-06-15

## 2024-06-13 RX ORDER — BUTALBITAL, ACETAMINOPHEN AND CAFFEINE 50; 325; 40 MG/1; MG/1; MG/1
1 TABLET ORAL EVERY 4 HOURS PRN
COMMUNITY
Start: 2024-05-14

## 2024-06-13 RX ORDER — AMOXICILLIN 500 MG/1
CAPSULE ORAL
COMMUNITY
Start: 2024-06-11

## 2024-06-13 RX ORDER — CHOLECALCIFEROL (VITAMIN D3) 1250 MCG
CAPSULE ORAL
COMMUNITY
Start: 2024-05-31

## 2024-06-13 RX ORDER — SEMAGLUTIDE 0.68 MG/ML
INJECTION, SOLUTION SUBCUTANEOUS
COMMUNITY
Start: 2024-05-14

## 2024-06-13 NOTE — PROGRESS NOTES
progressing through the bariatric preoperative evaluation. At this time, she is an appropriate candidate for weight loss surgery pending below requirements.    Plan:   -Complete remainder of preop evaluation including WLT classes  -Patient communicates understanding that the expectation is to lose or maintain weight during WLT.  Weight gain may result in delay or cancellation of surgery.   -Follow up once she has completed entirety of weight loss workup to determine next steps.      PÉREZ Aguirre - NP

## 2024-07-10 ENCOUNTER — HOSPITAL ENCOUNTER (OUTPATIENT)
Facility: HOSPITAL | Age: 39
Discharge: HOME | End: 2024-07-13

## 2024-07-10 ENCOUNTER — CLINICAL DOCUMENTATION (OUTPATIENT)
Facility: HOSPITAL | Age: 39
End: 2024-07-10

## 2024-07-10 NOTE — PROGRESS NOTES
Nutrition Evaluation    Patient's Name: Genevieve Castillo   Age: 38 y.o.  YOB: 1985   Sex: female    Height: 5 f 5   Starting Weight:  335  Weight: 338   BMI:            Smoking Status:  None  Alcohol Intake:  Number of Drinks at a Time: None  Number of Times a Week: None    Changes made during classes include:  Adding in more protein, less carbohydrates  Drinking mor water each day        Summary:  I feel that Genevieve Castillo has demonstrated appropriate diet changes and is ready to move forward with surgery.  Patient has been briefed on the importance of the protein drinks, vitamins, and the transition of the diet stages. Patient understands that the long-term diet will focus on protein and vegetables.  Patient understand the effects of carbohydrates after surgery and what reactive hypoglycemia is.      Patient is aware that they will be attending pre-op class 2 weeks before surgery and will get more detailed information on the post-op diet guidelines.    Patient will see me again at 6 weeks post-op. At this 6 week visit, RD will assess how patient is tolerating soft protein and advance to vegetables, if tolerating soft protein without difficulty.  Patient will also see RD again at 9 months post-op.  This visit will assess patient's compliance with current protocol, including diet, vitamins, protein shakes, and exercise.  Post-op diet guidelines will be reinforced.  RD is available for questions and to meet with patient outside of the 6 week and 9 month post-op visit.     We spent a lot of time talking about the vitamins.  Patient understands the importance of being compliant with the diet protocol and the complications and risks that can occur if they are non-compliant with the nutritional protocol.     Patient has attended at least one support group.    Candidate for surgery: Yes  Re-evaluation Date:     Procedure:  Gastric Sleeve    Muriel Anaya, RAHUL    7/10/2024

## 2024-07-10 NOTE — PROGRESS NOTES
[facility]  [address], [address]    Patient's Name: Genevieve Castillo     Age: 38 y.o.  YOB: 1985     Sex: female    Date:   7/10/2024          Session: 3 of  3  Surgeon:  Dr. Cooper    Height: 5 f 5   Weight:    335      Lbs.     BMI:    Pounds Lost since last month: 6                 Pounds Gained since last month: 0    Starting Weight: 338     Previous Month’s Weight: 341  Overall Pounds Lost: 3   Overall Pounds Gained: 0      Do you smoke?  None    Alcohol intake:  Number of drinks at a time:  None  Number of times a week: None    Patient has been to a support group.  Class Guidelines  Office Use Only: IP    Guidelines are reviewed with patient at the start of every class.    1. Patient understands that weight loss trial classes must be consecutive.  Patient understands if they miss a class, it is their responsibility to contact me to reschedule class.  I will reach out to patient after their first no show.  2.  Patient understands the expectations that weight maintenance/weight loss is expected during the classes.  Failure to demonstrate changes may result in one extra month of weight loss trial, followed by going back to see the surgeon.  Patient understands that they CAN NOT gain any weight during the weight loss trial.  Gaining weight will result in extra classes.  3. Patient is also instructed to be doing their labs, blood work, psych visit, support group and any other test that the surgeon has used while they are working on their weight loss trial.  4.  Patient was instructed to bring their blue binder to every class and appointment.      Other Pertinent Information:     Changes Made Since Last Class: More protein, less carbohydrates    Eating Habits and Behaviors    Today in class, we started talking about the key diet principles.  We first focused on stopping liquid calories.  Patient was also educated on

## 2024-07-16 ENCOUNTER — PREP FOR PROCEDURE (OUTPATIENT)
Age: 39
End: 2024-07-16

## 2024-07-16 ENCOUNTER — OFFICE VISIT (OUTPATIENT)
Age: 39
End: 2024-07-16

## 2024-07-16 VITALS
TEMPERATURE: 98 F | HEIGHT: 65 IN | HEART RATE: 64 BPM | SYSTOLIC BLOOD PRESSURE: 122 MMHG | BODY MASS INDEX: 48.82 KG/M2 | WEIGHT: 293 LBS | DIASTOLIC BLOOD PRESSURE: 83 MMHG | RESPIRATION RATE: 18 BRPM | OXYGEN SATURATION: 99 %

## 2024-07-16 DIAGNOSIS — E28.2 PCOS (POLYCYSTIC OVARIAN SYNDROME): ICD-10-CM

## 2024-07-16 DIAGNOSIS — E66.01 MORBID OBESITY (HCC): Primary | ICD-10-CM

## 2024-07-16 DIAGNOSIS — Z71.89 OTHER SPECIFIED COUNSELING: ICD-10-CM

## 2024-07-16 DIAGNOSIS — Z71.89 ENCOUNTER FOR PRE-BARIATRIC SURGERY COUNSELING AND EDUCATION: ICD-10-CM

## 2024-07-16 DIAGNOSIS — E11.65 TYPE 2 DIABETES MELLITUS WITH HYPERGLYCEMIA, WITHOUT LONG-TERM CURRENT USE OF INSULIN (HCC): ICD-10-CM

## 2024-07-16 DIAGNOSIS — N32.81 OVERACTIVE BLADDER: ICD-10-CM

## 2024-07-16 PROCEDURE — 99214 OFFICE O/P EST MOD 30 MIN: CPT | Performed by: SURGERY

## 2024-07-16 NOTE — PROGRESS NOTES
Bariatric Surgery Progress Note    CC: Preop appointment for bariatric surgery  Subjective:     Patient presents for a preop appointment for bariatric surgery having completed the insurance-mandated and clinically-relevant clearances/counseling.     REVIEW:     Lab review:   CMP normal  Liver profile normal  Glucose normal  TSH normal  Vit D low, 21.9- on replacement therapy  CBC largely unremarkable  Folate normal  B12 normal  B1 normal  A1c 6.1%    Nutrition: Finished all required nutrition sessions and cleared by dietitian    Psych: Completed mandatory pre-bariatric surgery psychological evaluation and cleared by psychology service    Attended support group    PCP: Clearance letter/note in chart    EGD / UGI reviewed / issues:   UGI 2024: Normal upper GI series     Denies nausea, vomiting, dysphagia  Rare reflux    Previous relevant surgeries:  x2 via pf    Patient Active Problem List    Diagnosis Date Noted    NEHEIMAS (generalized anxiety disorder) 2021    Type 2 diabetes mellitus with hyperglycemia, without long-term current use of insulin (HCC) 2021    PCOS (polycystic ovarian syndrome) 2018    Overactive bladder     Arthritic-like pain     Morbid obesity with body mass index of 50.0-59.9 in adult (HCC)     Morbid obesity (HCC)     Uses birth control     Infertility associated with anovulation     Sprain of cruciate ligament of knee 2015      Past Medical History:   Diagnosis Date    Arthritic-like pain     Infertility associated with anovulation     In the past    Morbid obesity (HCC)     Morbid obesity with body mass index of 50.0-59.9 in adult (HCC)     Overactive bladder     Type 2 diabetes mellitus without complication (HCC)     Uses birth control      Past Surgical History:   Procedure Laterality Date     SECTION      x2 via pf    HEENT      wisdom teeth extracted    ORTHOPEDIC SURGERY Bilateral     meniscus and ACl repairs      Social History     Tobacco

## 2024-07-16 NOTE — H&P (VIEW-ONLY)
Bariatric Surgery Progress Note    CC: Preop appointment for bariatric surgery  Subjective:     Patient presents for a preop appointment for bariatric surgery having completed the insurance-mandated and clinically-relevant clearances/counseling.     REVIEW:     Lab review:   CMP normal  Liver profile normal  Glucose normal  TSH normal  Vit D low, 21.9- on replacement therapy  CBC largely unremarkable  Folate normal  B12 normal  B1 normal  A1c 6.1%    Nutrition: Finished all required nutrition sessions and cleared by dietitian    Psych: Completed mandatory pre-bariatric surgery psychological evaluation and cleared by psychology service    Attended support group    PCP: Clearance letter/note in chart    EGD / UGI reviewed / issues:   UGI 2024: Normal upper GI series     Denies nausea, vomiting, dysphagia  Rare reflux    Previous relevant surgeries:  x2 via pf    Patient Active Problem List    Diagnosis Date Noted    NEHEMIAS (generalized anxiety disorder) 2021    Type 2 diabetes mellitus with hyperglycemia, without long-term current use of insulin (HCC) 2021    PCOS (polycystic ovarian syndrome) 2018    Overactive bladder     Arthritic-like pain     Morbid obesity with body mass index of 50.0-59.9 in adult (HCC)     Morbid obesity (HCC)     Uses birth control     Infertility associated with anovulation     Sprain of cruciate ligament of knee 2015      Past Medical History:   Diagnosis Date    Arthritic-like pain     Infertility associated with anovulation     In the past    Morbid obesity (HCC)     Morbid obesity with body mass index of 50.0-59.9 in adult (HCC)     Overactive bladder     Type 2 diabetes mellitus without complication (HCC)     Uses birth control      Past Surgical History:   Procedure Laterality Date     SECTION      x2 via pf    HEENT      wisdom teeth extracted    ORTHOPEDIC SURGERY Bilateral     meniscus and ACl repairs      Social History     Tobacco

## 2024-07-22 NOTE — PERIOP NOTE
Instructions for your surgery at [facility]      Today's Date:  7/22/2024      Patient's Name:  Genevieve Castillo           Surgery Date:  7/31              Please enter the main entrance of the hospital and check-in at the front security desk located in the lobby. They will direct you to the area to report for your surgery.     Do NOT eat or drink anything, including candy, gum, or ice chips after midnight prior to your surgery, unless you have specific instructions from your surgeon or anesthesia provider to do so.  Brush your teeth before coming to the hospital. You may swish with water, but do not swallow.  No smoking/Vaping/E-Cigarettes 24 hours prior to the day of surgery.  No alcohol 24 hours prior to the day of surgery.  No recreational drugs for one week prior to the day of surgery.  Bring Photo ID, Insurance information, and Co-pay if required on day of surgery.  Bring in pertinent legal documents, such as, Medical Power of , DNR, Advance Directive, etc.  Leave all valuables, including money/purse, at home.  Remove all jewelry, including ALL body piercings, nail polish, acrylic nails, and makeup (including mascara); no lotions, powders, deodorant, or perfume/cologne/after shave on the skin.  Follow instruction for Hibiclens washes and CHG wipes from surgeon's office.   Glasses and dentures may be worn to the hospital. They must be removed prior to surgery. Please bring case/container for glasses or dentures.   Contact lenses should not be worn on day of surgery.   Call your doctor's office if symptoms of a cold or illness develop within 24-48 hours prior to your surgery.  Call your doctor's office if you have any questions concerning insurance or co-pays.  15. AN ADULT (relative or friend 18 years or older) MUST DRIVE YOU HOME AFTER YOUR SURGERY.  16. Please make arrangements for a responsible adult (18 years or older) to be with you for 24 hours after your surgery.   17. TWO

## 2024-07-23 ENCOUNTER — FOLLOWUP TELEPHONE ENCOUNTER (OUTPATIENT)
Facility: HOSPITAL | Age: 39
End: 2024-07-23

## 2024-07-23 ENCOUNTER — CLINICAL DOCUMENTATION (OUTPATIENT)
Facility: HOSPITAL | Age: 39
End: 2024-07-23

## 2024-07-23 ENCOUNTER — HOSPITAL ENCOUNTER (OUTPATIENT)
Facility: HOSPITAL | Age: 39
Discharge: HOME | End: 2024-07-26

## 2024-07-23 NOTE — TELEPHONE ENCOUNTER
Gastric Bypass Instruct patient to read and understand how their surgery works.  The laparoscopic Gayle-en-Y Gastric Bypass -- often called gastric  bypass -- is considered the ‘gold standard’ of weight loss surgery.  The procedure:  The gastric bypass is one of the most frequently performed weight  loss procedures in the [place]. In this procedure, stapling  creates a small (15 to 20 milliliters) stomach pouch. The remainder  of the stomach is not removed but is completely stapled shut and divided from the stomach  pouch. The outlet from this newly formed pouch empties directly into the lower portion of the  small intestine, thus bypassing calorie absorption. This is done by dividing the small intestine just  beyond the duodenum for the purpose of bringing it up and constructing a connection with the  newly formed stomach pouch. The other end is connected into the side of the Gayle limb of the  intestine creating the “Y” shape that gives the technique its name. The length of either segment of  the intestine can be increased to produce lower or higher levels of malabsorption.  Most importantly, the rerouting of the food stream produces changes in gut hormones that  promote feeling of fullness, suppress hunger, and reverse one of the primary mechanisms by which  obesity induces Type 2 diabetes.  Advantages:   The average excess weight loss after the gastric bypass procedure is generally higher in a  compliant patient than with purely restrictive procedures.   One year after surgery, weight loss can average 60 to 80 percent of excess body weight.   Studies show that after 10 to 14 years, 50 to 60 percent of excess body weight loss has been  maintained by some patients.   A 2000 study of 500 patients showed that 96 percent of associated health conditions (back  pain, sleep apnea, high blood pressure, diabetes and depression) were improved or resolved.  Disadvantages:   Because the duodenum is bypassed, poor

## 2024-07-23 NOTE — PROGRESS NOTES
occur if they are non-compliant with the nutritional protocol and non-compliant with the vitamins.     Patient has also been educated on the pre-op liquid diet for 1 week.   Patient understands that failure to comply in pre-op liquid diet could result in surgery being canceled.    Patient's 6 week post-op nutrition appointment has been scheduled.   At this 6 week visit, RD will assess how patient is tolerating soft protein and advance to vegetables, if tolerating soft protein without difficulty.  Patient will also see RD again at 9 months post-op.  This visit will assess patient's compliance with current protocol, including diet, vitamins, protein shakes, and exercise.  Post-op diet guidelines will be reinforced.  RD is available for questions and to meet with patient outside of the 6 week and 9 month post-op visit.    Ok to proceed.     Candidate for surgery: Yes  Re-evaluation Date:     Muriel Anaya RD    7/23/2024

## 2024-07-30 ENCOUNTER — ANESTHESIA EVENT (OUTPATIENT)
Facility: HOSPITAL | Age: 39
End: 2024-07-30
Payer: COMMERCIAL

## 2024-07-30 ENCOUNTER — TELEPHONE (OUTPATIENT)
Age: 39
End: 2024-07-30

## 2024-07-31 ENCOUNTER — HOSPITAL ENCOUNTER (INPATIENT)
Facility: HOSPITAL | Age: 39
LOS: 1 days | Discharge: HOME | End: 2024-08-01
Attending: SURGERY | Admitting: SURGERY
Payer: COMMERCIAL

## 2024-07-31 ENCOUNTER — ANESTHESIA (OUTPATIENT)
Facility: HOSPITAL | Age: 39
End: 2024-07-31
Payer: COMMERCIAL

## 2024-07-31 DIAGNOSIS — G89.18 ACUTE POST-OPERATIVE PAIN: Primary | ICD-10-CM

## 2024-07-31 PROBLEM — E66.01 MORBID (SEVERE) OBESITY DUE TO EXCESS CALORIES (HCC): Status: ACTIVE | Noted: 2024-07-31

## 2024-07-31 LAB
ABO + RH BLD: NORMAL
BLOOD GROUP ANTIBODIES SERPL: NORMAL
GLUCOSE BLD STRIP.AUTO-MCNC: 162 MG/DL (ref 70–110)
GLUCOSE BLD STRIP.AUTO-MCNC: 168 MG/DL (ref 70–110)
GLUCOSE BLD STRIP.AUTO-MCNC: 198 MG/DL (ref 70–110)
GLUCOSE BLD STRIP.AUTO-MCNC: 208 MG/DL (ref 70–110)
HCG UR QL: NEGATIVE
SPECIMEN EXP DATE BLD: NORMAL

## 2024-07-31 PROCEDURE — 6360000002 HC RX W HCPCS: Performed by: NURSE ANESTHETIST, CERTIFIED REGISTERED

## 2024-07-31 PROCEDURE — 2709999900 HC NON-CHARGEABLE SUPPLY: Performed by: SURGERY

## 2024-07-31 PROCEDURE — 2500000003 HC RX 250 WO HCPCS: Performed by: NURSE ANESTHETIST, CERTIFIED REGISTERED

## 2024-07-31 PROCEDURE — S2900 ROBOTIC SURGICAL SYSTEM: HCPCS | Performed by: SURGERY

## 2024-07-31 PROCEDURE — 6360000002 HC RX W HCPCS: Performed by: SURGERY

## 2024-07-31 PROCEDURE — 94761 N-INVAS EAR/PLS OXIMETRY MLT: CPT

## 2024-07-31 PROCEDURE — 2580000003 HC RX 258: Performed by: SURGERY

## 2024-07-31 PROCEDURE — 7100000001 HC PACU RECOVERY - ADDTL 15 MIN: Performed by: SURGERY

## 2024-07-31 PROCEDURE — 7100000000 HC PACU RECOVERY - FIRST 15 MIN: Performed by: SURGERY

## 2024-07-31 PROCEDURE — 86901 BLOOD TYPING SEROLOGIC RH(D): CPT

## 2024-07-31 PROCEDURE — 86900 BLOOD TYPING SEROLOGIC ABO: CPT

## 2024-07-31 PROCEDURE — 86850 RBC ANTIBODY SCREEN: CPT

## 2024-07-31 PROCEDURE — 43775 LAP SLEEVE GASTRECTOMY: CPT | Performed by: SURGERY

## 2024-07-31 PROCEDURE — A4217 STERILE WATER/SALINE, 500 ML: HCPCS | Performed by: SURGERY

## 2024-07-31 PROCEDURE — C9290 INJ, BUPIVACAINE LIPOSOME: HCPCS | Performed by: SURGERY

## 2024-07-31 PROCEDURE — 0DB64Z3 EXCISION OF STOMACH, PERCUTANEOUS ENDOSCOPIC APPROACH, VERTICAL: ICD-10-PCS | Performed by: SURGERY

## 2024-07-31 PROCEDURE — 6360000002 HC RX W HCPCS: Performed by: ANESTHESIOLOGY

## 2024-07-31 PROCEDURE — 8E0W4CZ ROBOTIC ASSISTED PROCEDURE OF TRUNK REGION, PERCUTANEOUS ENDOSCOPIC APPROACH: ICD-10-PCS | Performed by: SURGERY

## 2024-07-31 PROCEDURE — 6370000000 HC RX 637 (ALT 250 FOR IP): Performed by: SURGERY

## 2024-07-31 PROCEDURE — 2500000003 HC RX 250 WO HCPCS: Performed by: SURGERY

## 2024-07-31 PROCEDURE — 2720000010 HC SURG SUPPLY STERILE: Performed by: SURGERY

## 2024-07-31 PROCEDURE — 1100000000 HC RM PRIVATE

## 2024-07-31 PROCEDURE — 3700000001 HC ADD 15 MINUTES (ANESTHESIA): Performed by: SURGERY

## 2024-07-31 PROCEDURE — 81025 URINE PREGNANCY TEST: CPT

## 2024-07-31 PROCEDURE — 88307 TISSUE EXAM BY PATHOLOGIST: CPT

## 2024-07-31 PROCEDURE — 6370000000 HC RX 637 (ALT 250 FOR IP): Performed by: ANESTHESIOLOGY

## 2024-07-31 PROCEDURE — 3600000019 HC SURGERY ROBOT ADDTL 15MIN: Performed by: SURGERY

## 2024-07-31 PROCEDURE — 82962 GLUCOSE BLOOD TEST: CPT

## 2024-07-31 PROCEDURE — 3600000009 HC SURGERY ROBOT BASE: Performed by: SURGERY

## 2024-07-31 PROCEDURE — 2700000000 HC OXYGEN THERAPY PER DAY

## 2024-07-31 PROCEDURE — 3700000000 HC ANESTHESIA ATTENDED CARE: Performed by: SURGERY

## 2024-07-31 RX ORDER — MAGNESIUM HYDROXIDE 1200 MG/15ML
LIQUID ORAL CONTINUOUS PRN
Status: COMPLETED | OUTPATIENT
Start: 2024-07-31 | End: 2024-07-31

## 2024-07-31 RX ORDER — NALOXONE HYDROCHLORIDE 0.4 MG/ML
INJECTION, SOLUTION INTRAMUSCULAR; INTRAVENOUS; SUBCUTANEOUS PRN
Status: DISCONTINUED | OUTPATIENT
Start: 2024-07-31 | End: 2024-07-31 | Stop reason: HOSPADM

## 2024-07-31 RX ORDER — SODIUM CHLORIDE 9 MG/ML
INJECTION, SOLUTION INTRAVENOUS PRN
Status: DISCONTINUED | OUTPATIENT
Start: 2024-07-31 | End: 2024-07-31 | Stop reason: HOSPADM

## 2024-07-31 RX ORDER — FENTANYL CITRATE 50 UG/ML
25 INJECTION, SOLUTION INTRAMUSCULAR; INTRAVENOUS EVERY 5 MIN PRN
Status: DISCONTINUED | OUTPATIENT
Start: 2024-07-31 | End: 2024-07-31 | Stop reason: HOSPADM

## 2024-07-31 RX ORDER — ROCURONIUM BROMIDE 10 MG/ML
INJECTION, SOLUTION INTRAVENOUS PRN
Status: DISCONTINUED | OUTPATIENT
Start: 2024-07-31 | End: 2024-07-31 | Stop reason: SDUPTHER

## 2024-07-31 RX ORDER — SODIUM CHLORIDE, SODIUM LACTATE, POTASSIUM CHLORIDE, CALCIUM CHLORIDE 600; 310; 30; 20 MG/100ML; MG/100ML; MG/100ML; MG/100ML
INJECTION, SOLUTION INTRAVENOUS CONTINUOUS
Status: DISCONTINUED | OUTPATIENT
Start: 2024-07-31 | End: 2024-07-31 | Stop reason: HOSPADM

## 2024-07-31 RX ORDER — LIDOCAINE HYDROCHLORIDE 20 MG/ML
INJECTION, SOLUTION EPIDURAL; INFILTRATION; INTRACAUDAL; PERINEURAL PRN
Status: DISCONTINUED | OUTPATIENT
Start: 2024-07-31 | End: 2024-07-31 | Stop reason: SDUPTHER

## 2024-07-31 RX ORDER — ACETAMINOPHEN 120 MG/1
SUPPOSITORY RECTAL PRN
Status: DISCONTINUED | OUTPATIENT
Start: 2024-07-31 | End: 2024-07-31 | Stop reason: ALTCHOICE

## 2024-07-31 RX ORDER — SODIUM CHLORIDE 0.9 % (FLUSH) 0.9 %
5-40 SYRINGE (ML) INJECTION EVERY 12 HOURS SCHEDULED
Status: DISCONTINUED | OUTPATIENT
Start: 2024-07-31 | End: 2024-07-31 | Stop reason: HOSPADM

## 2024-07-31 RX ORDER — INSULIN LISPRO 100 [IU]/ML
0-15 INJECTION, SOLUTION INTRAVENOUS; SUBCUTANEOUS ONCE
Status: COMPLETED | OUTPATIENT
Start: 2024-07-31 | End: 2024-07-31

## 2024-07-31 RX ORDER — INSULIN LISPRO 100 [IU]/ML
0-4 INJECTION, SOLUTION INTRAVENOUS; SUBCUTANEOUS NIGHTLY
Status: DISCONTINUED | OUTPATIENT
Start: 2024-07-31 | End: 2024-08-01 | Stop reason: HOSPADM

## 2024-07-31 RX ORDER — SCOLOPAMINE TRANSDERMAL SYSTEM 1 MG/1
1 PATCH, EXTENDED RELEASE TRANSDERMAL
Status: DISCONTINUED | OUTPATIENT
Start: 2024-07-31 | End: 2024-07-31

## 2024-07-31 RX ORDER — HYDRALAZINE HYDROCHLORIDE 20 MG/ML
5 INJECTION INTRAMUSCULAR; INTRAVENOUS ONCE
Status: COMPLETED | OUTPATIENT
Start: 2024-07-31 | End: 2024-07-31

## 2024-07-31 RX ORDER — SODIUM CHLORIDE, SODIUM LACTATE, POTASSIUM CHLORIDE, CALCIUM CHLORIDE 600; 310; 30; 20 MG/100ML; MG/100ML; MG/100ML; MG/100ML
INJECTION, SOLUTION INTRAVENOUS CONTINUOUS
Status: DISCONTINUED | OUTPATIENT
Start: 2024-07-31 | End: 2024-08-01 | Stop reason: HOSPADM

## 2024-07-31 RX ORDER — ONDANSETRON 2 MG/ML
4 INJECTION INTRAMUSCULAR; INTRAVENOUS
Status: DISCONTINUED | OUTPATIENT
Start: 2024-07-31 | End: 2024-07-31 | Stop reason: HOSPADM

## 2024-07-31 RX ORDER — METHOCARBAMOL 750 MG/1
750 TABLET, FILM COATED ORAL EVERY 8 HOURS PRN
Status: DISCONTINUED | OUTPATIENT
Start: 2024-07-31 | End: 2024-08-01 | Stop reason: HOSPADM

## 2024-07-31 RX ORDER — PROPOFOL 10 MG/ML
INJECTION, EMULSION INTRAVENOUS PRN
Status: DISCONTINUED | OUTPATIENT
Start: 2024-07-31 | End: 2024-07-31 | Stop reason: SDUPTHER

## 2024-07-31 RX ORDER — GLUCAGON 1 MG
1 KIT INJECTION PRN
Status: DISCONTINUED | OUTPATIENT
Start: 2024-07-31 | End: 2024-07-31 | Stop reason: HOSPADM

## 2024-07-31 RX ORDER — ENOXAPARIN SODIUM 100 MG/ML
40 INJECTION SUBCUTANEOUS EVERY 12 HOURS SCHEDULED
Status: DISCONTINUED | OUTPATIENT
Start: 2024-07-31 | End: 2024-08-01 | Stop reason: HOSPADM

## 2024-07-31 RX ORDER — SODIUM CHLORIDE 9 MG/ML
INJECTION, SOLUTION INTRAVENOUS PRN
Status: DISCONTINUED | OUTPATIENT
Start: 2024-07-31 | End: 2024-08-01 | Stop reason: HOSPADM

## 2024-07-31 RX ORDER — DEXTROSE MONOHYDRATE 100 MG/ML
INJECTION, SOLUTION INTRAVENOUS CONTINUOUS PRN
Status: DISCONTINUED | OUTPATIENT
Start: 2024-07-31 | End: 2024-07-31 | Stop reason: HOSPADM

## 2024-07-31 RX ORDER — MIDAZOLAM HYDROCHLORIDE 1 MG/ML
INJECTION INTRAMUSCULAR; INTRAVENOUS PRN
Status: DISCONTINUED | OUTPATIENT
Start: 2024-07-31 | End: 2024-07-31 | Stop reason: SDUPTHER

## 2024-07-31 RX ORDER — GLYCOPYRROLATE 0.2 MG/ML
INJECTION INTRAMUSCULAR; INTRAVENOUS PRN
Status: DISCONTINUED | OUTPATIENT
Start: 2024-07-31 | End: 2024-07-31 | Stop reason: SDUPTHER

## 2024-07-31 RX ORDER — SODIUM CHLORIDE 0.9 % (FLUSH) 0.9 %
5-40 SYRINGE (ML) INJECTION PRN
Status: DISCONTINUED | OUTPATIENT
Start: 2024-07-31 | End: 2024-07-31 | Stop reason: HOSPADM

## 2024-07-31 RX ORDER — GLUCAGON 1 MG
1 KIT INJECTION PRN
Status: DISCONTINUED | OUTPATIENT
Start: 2024-07-31 | End: 2024-08-01 | Stop reason: HOSPADM

## 2024-07-31 RX ORDER — OXYCODONE HYDROCHLORIDE 5 MG/1
5 TABLET ORAL EVERY 4 HOURS PRN
Status: DISCONTINUED | OUTPATIENT
Start: 2024-07-31 | End: 2024-08-01 | Stop reason: HOSPADM

## 2024-07-31 RX ORDER — FENTANYL CITRATE 50 UG/ML
INJECTION, SOLUTION INTRAMUSCULAR; INTRAVENOUS PRN
Status: DISCONTINUED | OUTPATIENT
Start: 2024-07-31 | End: 2024-07-31 | Stop reason: SDUPTHER

## 2024-07-31 RX ORDER — SIMETHICONE 80 MG
80 TABLET,CHEWABLE ORAL EVERY 6 HOURS PRN
Status: DISCONTINUED | OUTPATIENT
Start: 2024-07-31 | End: 2024-08-01 | Stop reason: HOSPADM

## 2024-07-31 RX ORDER — LIDOCAINE HYDROCHLORIDE 10 MG/ML
1 INJECTION, SOLUTION EPIDURAL; INFILTRATION; INTRACAUDAL; PERINEURAL
Status: DISCONTINUED | OUTPATIENT
Start: 2024-07-31 | End: 2024-07-31 | Stop reason: HOSPADM

## 2024-07-31 RX ORDER — DEXTROSE MONOHYDRATE 100 MG/ML
INJECTION, SOLUTION INTRAVENOUS CONTINUOUS PRN
Status: DISCONTINUED | OUTPATIENT
Start: 2024-07-31 | End: 2024-08-01 | Stop reason: HOSPADM

## 2024-07-31 RX ORDER — DIPHENHYDRAMINE HYDROCHLORIDE 50 MG/ML
25 INJECTION INTRAMUSCULAR; INTRAVENOUS EVERY 6 HOURS PRN
Status: DISCONTINUED | OUTPATIENT
Start: 2024-07-31 | End: 2024-08-01 | Stop reason: HOSPADM

## 2024-07-31 RX ORDER — SODIUM CHLORIDE 0.9 % (FLUSH) 0.9 %
5-40 SYRINGE (ML) INJECTION PRN
Status: DISCONTINUED | OUTPATIENT
Start: 2024-07-31 | End: 2024-08-01 | Stop reason: HOSPADM

## 2024-07-31 RX ORDER — ONDANSETRON 2 MG/ML
4 INJECTION INTRAMUSCULAR; INTRAVENOUS EVERY 6 HOURS PRN
Status: DISCONTINUED | OUTPATIENT
Start: 2024-07-31 | End: 2024-08-01 | Stop reason: HOSPADM

## 2024-07-31 RX ORDER — DEXAMETHASONE SODIUM PHOSPHATE 4 MG/ML
INJECTION, SOLUTION INTRA-ARTICULAR; INTRALESIONAL; INTRAMUSCULAR; INTRAVENOUS; SOFT TISSUE PRN
Status: DISCONTINUED | OUTPATIENT
Start: 2024-07-31 | End: 2024-07-31 | Stop reason: SDUPTHER

## 2024-07-31 RX ORDER — NEOSTIGMINE METHYLSULFATE 1 MG/ML
INJECTION, SOLUTION INTRAVENOUS PRN
Status: DISCONTINUED | OUTPATIENT
Start: 2024-07-31 | End: 2024-07-31 | Stop reason: SDUPTHER

## 2024-07-31 RX ORDER — ACETAMINOPHEN 325 MG/1
650 TABLET ORAL EVERY 6 HOURS
Status: DISCONTINUED | OUTPATIENT
Start: 2024-07-31 | End: 2024-08-01 | Stop reason: HOSPADM

## 2024-07-31 RX ORDER — SUCCINYLCHOLINE/SOD CL,ISO/PF 100 MG/5ML
SYRINGE (ML) INTRAVENOUS PRN
Status: DISCONTINUED | OUTPATIENT
Start: 2024-07-31 | End: 2024-07-31 | Stop reason: SDUPTHER

## 2024-07-31 RX ORDER — METOCLOPRAMIDE HYDROCHLORIDE 5 MG/ML
10 INJECTION INTRAMUSCULAR; INTRAVENOUS EVERY 6 HOURS PRN
Status: DISCONTINUED | OUTPATIENT
Start: 2024-07-31 | End: 2024-08-01 | Stop reason: HOSPADM

## 2024-07-31 RX ORDER — HYDRALAZINE HYDROCHLORIDE 20 MG/ML
10 INJECTION INTRAMUSCULAR; INTRAVENOUS EVERY 6 HOURS PRN
Status: DISCONTINUED | OUTPATIENT
Start: 2024-07-31 | End: 2024-08-01 | Stop reason: HOSPADM

## 2024-07-31 RX ORDER — ENOXAPARIN SODIUM 100 MG/ML
40 INJECTION SUBCUTANEOUS ONCE
Status: COMPLETED | OUTPATIENT
Start: 2024-07-31 | End: 2024-07-31

## 2024-07-31 RX ORDER — DIPHENHYDRAMINE HCL 25 MG
25 CAPSULE ORAL EVERY 6 HOURS PRN
Status: DISCONTINUED | OUTPATIENT
Start: 2024-07-31 | End: 2024-08-01 | Stop reason: HOSPADM

## 2024-07-31 RX ORDER — BUPIVACAINE HYDROCHLORIDE 2.5 MG/ML
INJECTION, SOLUTION EPIDURAL; INFILTRATION; INTRACAUDAL PRN
Status: DISCONTINUED | OUTPATIENT
Start: 2024-07-31 | End: 2024-07-31 | Stop reason: ALTCHOICE

## 2024-07-31 RX ORDER — SODIUM CHLORIDE 0.9 % (FLUSH) 0.9 %
5-40 SYRINGE (ML) INJECTION EVERY 12 HOURS SCHEDULED
Status: DISCONTINUED | OUTPATIENT
Start: 2024-07-31 | End: 2024-08-01 | Stop reason: HOSPADM

## 2024-07-31 RX ORDER — ONDANSETRON 4 MG/1
4 TABLET, ORALLY DISINTEGRATING ORAL EVERY 8 HOURS PRN
Status: DISCONTINUED | OUTPATIENT
Start: 2024-07-31 | End: 2024-08-01 | Stop reason: HOSPADM

## 2024-07-31 RX ORDER — INSULIN LISPRO 100 [IU]/ML
0-4 INJECTION, SOLUTION INTRAVENOUS; SUBCUTANEOUS
Status: DISCONTINUED | OUTPATIENT
Start: 2024-07-31 | End: 2024-08-01 | Stop reason: HOSPADM

## 2024-07-31 RX ORDER — ONDANSETRON 2 MG/ML
INJECTION INTRAMUSCULAR; INTRAVENOUS PRN
Status: DISCONTINUED | OUTPATIENT
Start: 2024-07-31 | End: 2024-07-31 | Stop reason: SDUPTHER

## 2024-07-31 RX ADMIN — ENOXAPARIN SODIUM 40 MG: 100 INJECTION SUBCUTANEOUS at 20:07

## 2024-07-31 RX ADMIN — FENTANYL CITRATE 50 MCG: 50 INJECTION INTRAMUSCULAR; INTRAVENOUS at 11:40

## 2024-07-31 RX ADMIN — Medication 180 MG: at 10:18

## 2024-07-31 RX ADMIN — ACETAMINOPHEN 325MG 650 MG: 325 TABLET ORAL at 20:06

## 2024-07-31 RX ADMIN — PROPOFOL 200 MG: 10 INJECTION, EMULSION INTRAVENOUS at 10:18

## 2024-07-31 RX ADMIN — ENOXAPARIN SODIUM 40 MG: 100 INJECTION SUBCUTANEOUS at 08:06

## 2024-07-31 RX ADMIN — SODIUM CHLORIDE, SODIUM LACTATE, POTASSIUM CHLORIDE, AND CALCIUM CHLORIDE: 600; 310; 30; 20 INJECTION, SOLUTION INTRAVENOUS at 10:48

## 2024-07-31 RX ADMIN — FAMOTIDINE 20 MG: 10 INJECTION, SOLUTION INTRAVENOUS at 08:05

## 2024-07-31 RX ADMIN — FENTANYL CITRATE 50 MCG: 50 INJECTION INTRAMUSCULAR; INTRAVENOUS at 10:18

## 2024-07-31 RX ADMIN — HYDROMORPHONE HYDROCHLORIDE 0.5 MG: 1 INJECTION, SOLUTION INTRAMUSCULAR; INTRAVENOUS; SUBCUTANEOUS at 12:04

## 2024-07-31 RX ADMIN — ROCURONIUM BROMIDE 45 MG: 10 INJECTION, SOLUTION INTRAVENOUS at 10:25

## 2024-07-31 RX ADMIN — Medication 3 MG: at 11:27

## 2024-07-31 RX ADMIN — INSULIN LISPRO 1 UNITS: 100 INJECTION, SOLUTION INTRAVENOUS; SUBCUTANEOUS at 16:57

## 2024-07-31 RX ADMIN — SODIUM CHLORIDE, SODIUM LACTATE, POTASSIUM CHLORIDE, AND CALCIUM CHLORIDE: 600; 310; 30; 20 INJECTION, SOLUTION INTRAVENOUS at 07:30

## 2024-07-31 RX ADMIN — FOSAPREPITANT DIMEGLUMINE 150 MG: 150 INJECTION, POWDER, LYOPHILIZED, FOR SOLUTION INTRAVENOUS at 07:58

## 2024-07-31 RX ADMIN — ROCURONIUM BROMIDE 5 MG: 10 INJECTION, SOLUTION INTRAVENOUS at 10:18

## 2024-07-31 RX ADMIN — INSULIN LISPRO 3 UNITS: 100 INJECTION, SOLUTION INTRAVENOUS; SUBCUTANEOUS at 08:37

## 2024-07-31 RX ADMIN — SODIUM CHLORIDE, POTASSIUM CHLORIDE, SODIUM LACTATE AND CALCIUM CHLORIDE: 600; 310; 30; 20 INJECTION, SOLUTION INTRAVENOUS at 23:44

## 2024-07-31 RX ADMIN — SODIUM CHLORIDE, PRESERVATIVE FREE 10 ML: 5 INJECTION INTRAVENOUS at 20:09

## 2024-07-31 RX ADMIN — SODIUM CHLORIDE, PRESERVATIVE FREE 40 MG: 5 INJECTION INTRAVENOUS at 14:17

## 2024-07-31 RX ADMIN — ROCURONIUM BROMIDE 20 MG: 10 INJECTION, SOLUTION INTRAVENOUS at 10:46

## 2024-07-31 RX ADMIN — LIDOCAINE HYDROCHLORIDE 100 MG: 20 INJECTION, SOLUTION EPIDURAL; INFILTRATION; INTRACAUDAL; PERINEURAL at 10:18

## 2024-07-31 RX ADMIN — OXYCODONE HYDROCHLORIDE 5 MG: 5 TABLET ORAL at 16:23

## 2024-07-31 RX ADMIN — HYDRALAZINE HYDROCHLORIDE 5 MG: 20 INJECTION, SOLUTION INTRAMUSCULAR; INTRAVENOUS at 12:46

## 2024-07-31 RX ADMIN — WATER 3000 MG: 1 INJECTION, SOLUTION INTRAMUSCULAR; INTRAVENOUS; SUBCUTANEOUS at 10:21

## 2024-07-31 RX ADMIN — DEXAMETHASONE SODIUM PHOSPHATE 4 MG: 4 INJECTION INTRA-ARTICULAR; INTRALESIONAL; INTRAMUSCULAR; INTRAVENOUS; SOFT TISSUE at 10:21

## 2024-07-31 RX ADMIN — OXYCODONE HYDROCHLORIDE 5 MG: 5 TABLET ORAL at 20:05

## 2024-07-31 RX ADMIN — HYDRALAZINE HYDROCHLORIDE 10 MG: 20 INJECTION INTRAMUSCULAR; INTRAVENOUS at 16:23

## 2024-07-31 RX ADMIN — MIDAZOLAM 2 MG: 1 INJECTION, SOLUTION INTRAMUSCULAR; INTRAVENOUS at 10:09

## 2024-07-31 RX ADMIN — OXYCODONE HYDROCHLORIDE 5 MG: 5 TABLET ORAL at 23:42

## 2024-07-31 RX ADMIN — SODIUM CHLORIDE, POTASSIUM CHLORIDE, SODIUM LACTATE AND CALCIUM CHLORIDE: 600; 310; 30; 20 INJECTION, SOLUTION INTRAVENOUS at 14:17

## 2024-07-31 RX ADMIN — ACETAMINOPHEN 325MG 650 MG: 325 TABLET ORAL at 16:23

## 2024-07-31 RX ADMIN — FENTANYL CITRATE 50 MCG: 50 INJECTION INTRAMUSCULAR; INTRAVENOUS at 10:34

## 2024-07-31 RX ADMIN — FENTANYL CITRATE 50 MCG: 50 INJECTION INTRAMUSCULAR; INTRAVENOUS at 10:37

## 2024-07-31 RX ADMIN — SODIUM CHLORIDE, POTASSIUM CHLORIDE, SODIUM LACTATE AND CALCIUM CHLORIDE: 600; 310; 30; 20 INJECTION, SOLUTION INTRAVENOUS at 20:10

## 2024-07-31 RX ADMIN — HYOSCYAMINE SULFATE 125 MCG: 0.12 TABLET ORAL; SUBLINGUAL at 16:23

## 2024-07-31 RX ADMIN — GLYCOPYRROLATE 0.4 MG: 0.2 INJECTION INTRAMUSCULAR; INTRAVENOUS at 11:27

## 2024-07-31 RX ADMIN — ONDANSETRON 4 MG: 2 INJECTION INTRAMUSCULAR; INTRAVENOUS at 11:18

## 2024-07-31 ASSESSMENT — PAIN DESCRIPTION - ONSET
ONSET: ON-GOING
ONSET: ON-GOING

## 2024-07-31 ASSESSMENT — PAIN SCALES - GENERAL
PAINLEVEL_OUTOF10: 5
PAINLEVEL_OUTOF10: 6
PAINLEVEL_OUTOF10: 2
PAINLEVEL_OUTOF10: 5
PAINLEVEL_OUTOF10: 8
PAINLEVEL_OUTOF10: 4
PAINLEVEL_OUTOF10: 5

## 2024-07-31 ASSESSMENT — PAIN DESCRIPTION - LOCATION
LOCATION: ABDOMEN

## 2024-07-31 ASSESSMENT — PAIN DESCRIPTION - ORIENTATION
ORIENTATION: ANTERIOR
ORIENTATION: MID

## 2024-07-31 ASSESSMENT — PAIN DESCRIPTION - PAIN TYPE
TYPE: SURGICAL PAIN

## 2024-07-31 ASSESSMENT — PAIN - FUNCTIONAL ASSESSMENT
PAIN_FUNCTIONAL_ASSESSMENT: ACTIVITIES ARE NOT PREVENTED

## 2024-07-31 ASSESSMENT — PAIN DESCRIPTION - FREQUENCY
FREQUENCY: CONTINUOUS
FREQUENCY: CONTINUOUS
FREQUENCY: INTERMITTENT

## 2024-07-31 ASSESSMENT — PAIN DESCRIPTION - DESCRIPTORS
DESCRIPTORS: SHARP;PRESSURE
DESCRIPTORS: ACHING
DESCRIPTORS: ACHING;SHARP

## 2024-07-31 NOTE — ANESTHESIA PRE PROCEDURE
AM    MCV 94 05/28/2024 08:14 AM    RDW 14.7 05/28/2024 08:14 AM     05/28/2024 08:14 AM       CMP:   Lab Results   Component Value Date/Time     05/28/2024 08:14 AM    K 4.8 05/28/2024 08:14 AM    CL 99 05/28/2024 08:14 AM    CO2 25 05/28/2024 08:14 AM    BUN 18 05/28/2024 08:14 AM    CREATININE 0.6 05/28/2024 08:14 AM    AGRATIO 1.8 05/28/2024 08:14 AM    LABGLOM >60.0 05/28/2024 08:14 AM    GLUCOSE 98 05/28/2024 08:14 AM    CALCIUM 9.4 05/28/2024 08:14 AM    BILITOT 0.2 05/28/2024 08:14 AM    ALKPHOS 82 05/28/2024 08:14 AM    AST 15 05/28/2024 08:14 AM    ALT 22 05/28/2024 08:14 AM       POC Tests:   Recent Labs     07/31/24  0657   POCGLU 162*       Coags: No results found for: \"PROTIME\", \"INR\", \"APTT\"    HCG (If Applicable):   Lab Results   Component Value Date    PREGTESTUR Negative 07/31/2024        ABGs: No results found for: \"PHART\", \"PO2ART\", \"SBV3NGH\", \"HNF3KEY\", \"BEART\", \"O0ZTHMLE\"     Type & Screen (If Applicable):  No results found for: \"LABABO\"    Drug/Infectious Status (If Applicable):  No results found for: \"HIV\", \"HEPCAB\"    COVID-19 Screening (If Applicable): No results found for: \"COVID19\"        Anesthesia Evaluation  Patient summary reviewed  Airway: Mallampati: III  TM distance: >3 FB   Neck ROM: full  Mouth opening: > = 3 FB   Dental:          Pulmonary:                              Cardiovascular:          ECG reviewed                        Neuro/Psych:   (+) psychiatric history:            GI/Hepatic/Renal:   (+) morbid obesity          Endo/Other:    (+) DiabetesType II DM, no interval change.                 Abdominal:             Vascular:          Other Findings:       Anesthesia Plan      general     ASA 3       Induction: intravenous.      Anesthetic plan and risks discussed with patient.      Plan discussed with CRNA.    Attending anesthesiologist reviewed and agrees with Preprocedure content            Gordo Celestin Jr, MD   7/31/2024

## 2024-07-31 NOTE — ANESTHESIA POSTPROCEDURE EVALUATION
Department of Anesthesiology  Postprocedure Note    Patient: Genevieve Castillo  MRN: [id number]  YOB: 1985  Date of evaluation: 7/31/2024    Procedure Summary       Date: 07/31/24 Room / Location: [facility] MAIN 04 / [facility] MAIN OR    Anesthesia Start: 1009 Anesthesia Stop: 1153    Procedure: ROBOT ASSISTED LAPAROSCOPIC SLEEVE GASTRECTOMY (Abdomen) Diagnosis:       Morbid obesity (HCC)      Body mass index 50.0-59.9, adult (HCC)      PCOS (polycystic ovarian syndrome)      Controlled diabetes mellitus with hyperglycemia, without long-term current use of insulin (HCC)      Overactive bladder      Other specified counseling      (Morbid obesity (HCC) [E66.01])      (Body mass index 50.0-59.9, adult (HCC) [Z68.43])      (PCOS (polycystic ovarian syndrome) [E28.2])      (Controlled diabetes mellitus with hyperglycemia, without long-term current use of insulin (HCC) [E11.65])      (Overactive bladder [N32.81])      (Other specified counseling [Z71.89])    Surgeons: Kenji Cooper MD Responsible Provider: Gordo Celestin Jr., MD    Anesthesia Type: General ASA Status: 3            Anesthesia Type: General    Janel Phase I: Janel Score: 9    Janel Phase II:      Anesthesia Post Evaluation    Patient location during evaluation: PACU  Patient participation: complete - patient participated  Level of consciousness: awake and alert  Pain score: 0  Airway patency: patent  Nausea & Vomiting: no nausea and no vomiting  Cardiovascular status: hemodynamically stable  Respiratory status: acceptable  Hydration status: euvolemic  Multimodal analgesia pain management approach  Pain management: adequate    No notable events documented.

## 2024-07-31 NOTE — PERIOP NOTE
TRANSFER - OUT REPORT:    Verbal report given to RUTH Leavitt on Genevieve Castillo  being transferred to [address] [place] [address] for routine progression of patient care       Report consisted of patient's Situation, Background, Assessment and   Recommendations(SBAR).     Information from the following report(s) Nurse Handoff Report, Surgery Report, MAR, Recent Results, and Cardiac Rhythm sinus rhythm/sinus nora  was reviewed with the receiving nurse.           Lines:   Peripheral IV 07/31/24 Left;Posterior Hand (Active)   Site Assessment Clean, dry & intact 07/31/24 1148   Line Status Infusing 07/31/24 1148   Phlebitis Assessment No symptoms 07/31/24 1148   Infiltration Assessment 0 07/31/24 1148   Dressing Status Clean, dry & intact 07/31/24 1148   Dressing Type Transparent 07/31/24 1148       Peripheral IV 07/31/24 Posterior;Right Hand (Active)   Site Assessment Clean, dry & intact 07/31/24 1148   Line Status Capped 07/31/24 1148   Phlebitis Assessment No symptoms 07/31/24 1148   Infiltration Assessment 0 07/31/24 1148   Dressing Status Clean, dry & intact 07/31/24 1148   Dressing Type Transparent 07/31/24 1148        Opportunity for questions and clarification was provided.      Patient transported with:  O2 @ 2lpm and Tech

## 2024-07-31 NOTE — INTERVAL H&P NOTE
Update History & Physical    The patient's History and Physical of July 16, history and procedure was reviewed with the patient and I examined the patient. There was no change. The surgical site was confirmed by the patient and me.     Plan: The risks, benefits, expected outcome, and alternative to the recommended procedure have been discussed with the patient. Patient understands and wants to proceed with the procedure.     Electronically signed by Kenji Cooper MD on 7/31/2024 at 9:23 AM

## 2024-07-31 NOTE — OP NOTE
Operative Report    Patient: Genevieve Castillo MRN: [id number]  SSN: [id number]    YOB: 1985  Age: 38 y.o.  Sex: female       Date of Surgery: 07/31/24     Preoperative Diagnosis:      * Morbid obesity (HCC) [E66.01]     * Body mass index 50.0-59.9, adult (HCC) [Z68.43]     * PCOS (polycystic ovarian syndrome) [E28.2]     * Controlled diabetes mellitus with hyperglycemia, without long-term current use of insulin (HCC) [E11.65]     * Overactive bladder [N32.81]     Postoperative Diagnosis:      * Morbid obesity (HCC) [E66.01]     * Body mass index 50.0-59.9, adult (HCC) [Z68.43]     * PCOS (polycystic ovarian syndrome) [E28.2]     * Controlled diabetes mellitus with hyperglycemia, without long-term current use of insulin (HCC) [E11.65]     * Overactive bladder [N32.81]    Surgeon(s) and Role:     * Kenji Cooper MD - Primary    Assistant: Janay Holliday SA (No qualified resident was available for assistance; assistant was involved in port placement, camera operation, manipulation and retraction of tissue, removing the excised specimen, and skin closure)    Anesthesia: General     Procedure:   Laparoscopic sleeve gastrectomy with robotic assist    Findings:  Infection Present At Time Of Surgery (PATOS) (choose all levels that have infection present):  No infection present  Other Findings: uneventful r-LSG. No hiatal hernia noted.      Procedure in Detail: Genevieve Castillo was identified in the pre-operative holding area. Informed consent was obtained after a complete discussion of risks, benefits and alternatives to surgery were had with the patient.    The patient was brought back to the operating room and placed under general endotracheal anesthesia in the supine position on the operating room table. SCDs were applied. Preop VTE prophylaxis as well as all other multimodal analgesia, GI prophylaxis, etc were verified. The patient was then prepped and draped in the usual sterile fashion after being

## 2024-07-31 NOTE — PROGRESS NOTES
4 Eyes Skin Assessment     NAME:  Genevieve Castillo  YOB: 1985  MEDICAL RECORD NUMBER:  [id number]    The patient is being assessed for  Shift Handoff    I agree that at least one RN has performed a thorough Head to Toe Skin Assessment on the patient. ALL assessment sites listed below have been assessed.      Areas assessed by both nurses:    Head, Face, Ears, Shoulders, Back, Chest, Arms, Elbows, Hands, Sacrum. Buttock, Coccyx, Ischium, Legs. Feet and Heels, and Under Medical Devices         Does the Patient have a Wound? No noted wound(s)       Michael Prevention initiated by RN: Yes  Wound Care Orders initiated by RN: No    Pressure Injury (Stage 3,4, Unstageable, DTI, NWPT, and Complex wounds) if present, place Wound referral order by RN under : No    New Ostomies, if present place, Ostomy referral order under : No     Nurse 1 eSignature: Electronically signed by Dagmar Vargas RN on 7/31/24 at 6:50 PM EDT    **SHARE this note so that the co-signing nurse can place an eSignature**    Nurse 2 eSignature: Electronically signed by BETTE GUTIERREZ RN on 8/1/24 at 5:12 AM EDT

## 2024-08-01 VITALS
OXYGEN SATURATION: 96 % | TEMPERATURE: 97.9 F | BODY MASS INDEX: 48.82 KG/M2 | HEIGHT: 65 IN | RESPIRATION RATE: 18 BRPM | WEIGHT: 293 LBS | SYSTOLIC BLOOD PRESSURE: 162 MMHG | HEART RATE: 72 BPM | DIASTOLIC BLOOD PRESSURE: 84 MMHG

## 2024-08-01 LAB
BASOPHILS # BLD: 0 K/UL (ref 0–0.1)
BASOPHILS NFR BLD: 0 % (ref 0–2)
DIFFERENTIAL METHOD BLD: ABNORMAL
EOSINOPHIL # BLD: 0 K/UL (ref 0–0.4)
EOSINOPHIL NFR BLD: 0 % (ref 0–5)
ERYTHROCYTE [DISTWIDTH] IN BLOOD BY AUTOMATED COUNT: 14.8 % (ref 11.6–14.5)
GLUCOSE BLD STRIP.AUTO-MCNC: 167 MG/DL (ref 70–110)
HCT VFR BLD AUTO: 33.6 % (ref 35–45)
HGB BLD-MCNC: 11.1 G/DL (ref 12–16)
IMM GRANULOCYTES # BLD AUTO: 0.1 K/UL (ref 0–0.04)
IMM GRANULOCYTES NFR BLD AUTO: 0 % (ref 0–0.5)
LYMPHOCYTES # BLD: 2.1 K/UL (ref 0.9–3.6)
LYMPHOCYTES NFR BLD: 12 % (ref 21–52)
MCH RBC QN AUTO: 29.7 PG (ref 24–34)
MCHC RBC AUTO-ENTMCNC: 33 G/DL (ref 31–37)
MCV RBC AUTO: 89.8 FL (ref 78–100)
MONOCYTES # BLD: 1 K/UL (ref 0.05–1.2)
MONOCYTES NFR BLD: 6 % (ref 3–10)
NEUTS SEG # BLD: 14.1 K/UL (ref 1.8–8)
NEUTS SEG NFR BLD: 82 % (ref 40–73)
NRBC # BLD: 0 K/UL (ref 0–0.01)
NRBC BLD-RTO: 0 PER 100 WBC
PLATELET # BLD AUTO: 416 K/UL (ref 135–420)
PMV BLD AUTO: 10.8 FL (ref 9.2–11.8)
RBC # BLD AUTO: 3.74 M/UL (ref 4.2–5.3)
WBC # BLD AUTO: 17.3 K/UL (ref 4.6–13.2)

## 2024-08-01 PROCEDURE — 36415 COLL VENOUS BLD VENIPUNCTURE: CPT

## 2024-08-01 PROCEDURE — 6360000002 HC RX W HCPCS: Performed by: SURGERY

## 2024-08-01 PROCEDURE — 82962 GLUCOSE BLOOD TEST: CPT

## 2024-08-01 PROCEDURE — 85025 COMPLETE CBC W/AUTO DIFF WBC: CPT

## 2024-08-01 PROCEDURE — 6370000000 HC RX 637 (ALT 250 FOR IP): Performed by: SURGERY

## 2024-08-01 PROCEDURE — 2580000003 HC RX 258: Performed by: SURGERY

## 2024-08-01 PROCEDURE — 99024 POSTOP FOLLOW-UP VISIT: CPT | Performed by: REGISTERED NURSE

## 2024-08-01 PROCEDURE — 94761 N-INVAS EAR/PLS OXIMETRY MLT: CPT

## 2024-08-01 RX ORDER — ONDANSETRON 4 MG/1
4 TABLET, ORALLY DISINTEGRATING ORAL EVERY 8 HOURS PRN
Qty: 15 TABLET | Refills: 0 | Status: SHIPPED | OUTPATIENT
Start: 2024-08-01

## 2024-08-01 RX ORDER — SIMETHICONE 80 MG
80 TABLET,CHEWABLE ORAL EVERY 6 HOURS PRN
Qty: 12 TABLET | Refills: 0 | Status: SHIPPED | OUTPATIENT
Start: 2024-08-01

## 2024-08-01 RX ORDER — OMEPRAZOLE 20 MG/1
20 CAPSULE, DELAYED RELEASE ORAL
Qty: 30 CAPSULE | Refills: 5 | Status: SHIPPED | OUTPATIENT
Start: 2024-08-01

## 2024-08-01 RX ORDER — ACETAMINOPHEN 325 MG/1
325 TABLET ORAL EVERY 6 HOURS PRN
Qty: 56 TABLET | Refills: 0 | Status: SHIPPED | OUTPATIENT
Start: 2024-08-01

## 2024-08-01 RX ORDER — ENOXAPARIN SODIUM 100 MG/ML
40 INJECTION SUBCUTANEOUS DAILY
Qty: 30 EACH | Refills: 0 | Status: SHIPPED | OUTPATIENT
Start: 2024-08-01

## 2024-08-01 RX ORDER — OXYCODONE HYDROCHLORIDE 5 MG/1
5 TABLET ORAL EVERY 6 HOURS PRN
Qty: 10 TABLET | Refills: 0 | Status: SHIPPED | OUTPATIENT
Start: 2024-08-01 | End: 2024-08-04

## 2024-08-01 RX ADMIN — SODIUM CHLORIDE, PRESERVATIVE FREE 10 ML: 5 INJECTION INTRAVENOUS at 08:14

## 2024-08-01 RX ADMIN — OXYCODONE HYDROCHLORIDE 5 MG: 5 TABLET ORAL at 03:40

## 2024-08-01 RX ADMIN — SODIUM CHLORIDE, PRESERVATIVE FREE 40 MG: 5 INJECTION INTRAVENOUS at 08:09

## 2024-08-01 RX ADMIN — HYDRALAZINE HYDROCHLORIDE 10 MG: 20 INJECTION INTRAMUSCULAR; INTRAVENOUS at 03:40

## 2024-08-01 RX ADMIN — OXYCODONE HYDROCHLORIDE 5 MG: 5 TABLET ORAL at 08:10

## 2024-08-01 RX ADMIN — ACETAMINOPHEN 325MG 650 MG: 325 TABLET ORAL at 03:39

## 2024-08-01 RX ADMIN — ENOXAPARIN SODIUM 40 MG: 100 INJECTION SUBCUTANEOUS at 08:09

## 2024-08-01 RX ADMIN — ACETAMINOPHEN 325MG 650 MG: 325 TABLET ORAL at 08:10

## 2024-08-01 ASSESSMENT — PAIN DESCRIPTION - LOCATION
LOCATION: ABDOMEN
LOCATION: ABDOMEN

## 2024-08-01 ASSESSMENT — PAIN - FUNCTIONAL ASSESSMENT
PAIN_FUNCTIONAL_ASSESSMENT: ACTIVITIES ARE NOT PREVENTED
PAIN_FUNCTIONAL_ASSESSMENT: ACTIVITIES ARE NOT PREVENTED

## 2024-08-01 ASSESSMENT — PAIN SCALES - GENERAL
PAINLEVEL_OUTOF10: 5
PAINLEVEL_OUTOF10: 0
PAINLEVEL_OUTOF10: 6
PAINLEVEL_OUTOF10: 0

## 2024-08-01 ASSESSMENT — PAIN DESCRIPTION - ORIENTATION
ORIENTATION: MID
ORIENTATION: MID;UPPER

## 2024-08-01 ASSESSMENT — PAIN DESCRIPTION - DESCRIPTORS
DESCRIPTORS: ACHING
DESCRIPTORS: ACHING

## 2024-08-01 ASSESSMENT — PAIN DESCRIPTION - PAIN TYPE: TYPE: SURGICAL PAIN

## 2024-08-01 NOTE — FLOWSHEET NOTE
4 Eyes Skin Assessment     NAME:  Geneveive Castillo  YOB: 1985  MEDICAL RECORD NUMBER:  [id number]    The patient is being assessed for  Shift Handoff    I agree that at least one RN has performed a thorough Head to Toe Skin Assessment on the patient. ALL assessment sites listed below have been assessed.      Areas assessed by both nurses:    Head, Face, Ears, Shoulders, Back, Chest, Arms, Elbows, Hands, Sacrum. Buttock, Coccyx, Ischium, and Legs. Feet and Heels        Does the Patient have a Wound? No noted wound(s)       Michael Prevention initiated by RN: No  Wound Care Orders initiated by RN: No    Pressure Injury (Stage 3,4, Unstageable, DTI, NWPT, and Complex wounds) if present, place Wound referral order by RN under : No    New Ostomies, if present place, Ostomy referral order under : No     Nurse 1 eSignature: Electronically signed by BETTE GUTIERREZ RN on 8/1/24 at 7:33 AM EDT    **SHARE this note so that the co-signing nurse can place an eSignature**    Nurse 2 eSignature: {Esignature:[id number]}

## 2024-08-01 NOTE — DISCHARGE SUMMARY
Bariatric Surgery Discharge Progress Note    Admission Date: 7/31/2024    Discharge Date: 8/1/2024    Preoperative Diagnosis:      * Morbid obesity (HCC) [E66.01]     * Body mass index 50.0-59.9, adult (HCC) [Z68.43]     * PCOS (polycystic ovarian syndrome) [E28.2]     * Controlled diabetes mellitus with hyperglycemia, without long-term current use of insulin (HCC) [E11.65]     * Overactive bladder [N32.81]      Postoperative Diagnosis:      * Morbid obesity (HCC) [E66.01]     * Body mass index 50.0-59.9, adult (HCC) [Z68.43]     * PCOS (polycystic ovarian syndrome) [E28.2]     * Controlled diabetes mellitus with hyperglycemia, without long-term current use of insulin (HCC) [E11.65]     * Overactive bladder [N32.81]     Procedure:   Laparoscopic sleeve gastrectomy with robotic assist    Postop Complications: none    Hospital Course:  Patient was admitted on 7/31/2024 for scheduled bariatric surgery.  Operation was without significant complication.  Patient admitted to the floor postoperatively, monitored as per protocol.  Diet sequentially advanced beginning POD 1, pain medications transitioned to oral during the hospital course. Currently the patient is afebrile, vital signs stable, tolerating a clear liquid diet with protein supplementation, voiding spontaneously, ambulatory with adequate pain control with oral medications and clear surgical sites without evidence of infection.    Discharge Diet:  Clear Liquid Bariatric Diet for 7 days, then soft moist protein diet for 5 weeks    Discharge Medications:     Medication List        START taking these medications      acetaminophen 325 MG tablet  Commonly known as: Tylenol  Take 1 tablet by mouth every 6 hours as needed for Pain     enoxaparin 40 MG/0.4ML  Commonly known as: Lovenox  Inject 0.4 mLs into the skin daily     hyoscyamine 125 MCG sublingual tablet  Commonly known as: Levsin/SL  Place 1 tablet under the tongue every 6 hours as needed (Stomach Cramping)

## 2024-08-01 NOTE — PROGRESS NOTES
Surgery Progress Note    8/1/2024    Admit Date: 7/31/2024    Subjective:     Ms. Castillo has complaints of abdominal discomfort, 5/10 soreness, but pain is controlled with current plan. She has been ambulating in halls. Denies dizziness, chest pain, shortness of breath. Bowel Movements: None. Tolerating initial drinking trial without nausea and vomiting. Allergies verified.      Objective:     Blood pressure (!) 161/85, pulse 72, temperature 98.3 °F (36.8 °C), temperature source Oral, resp. rate 20, height 1.651 m (5' 5\"), weight (!) 152.4 kg (336 lb), SpO2 95 %.    No intake/output data recorded.    07/30 1901 - 08/01 0700  In: 3441.4 [P.O.:540; I.V.:2652.9]  Out: 1700 [Urine:1700]    EXAM: GENERAL: alert, oriented x4, no distress   HEART: regular rate and rhythm. Noted elevated BP. Asymptomatic.    LUNGS: clear to auscultation   ABDOMEN:  Soft, obese, appropriate incisional tenderness, +BS, non-distended, surgical incisions clean, dry, no erythema or drainage   EXTREMITIES: warm, well perfused    Data Review    Recent Results (from the past 24 hour(s))   POCT Glucose    Collection Time: 07/31/24 11:57 AM   Result Value Ref Range    POC Glucose 168 (H) 70 - 110 mg/dL   POCT Glucose    Collection Time: 07/31/24  4:22 PM   Result Value Ref Range    POC Glucose 208 (H) 70 - 110 mg/dL   POCT Glucose    Collection Time: 07/31/24  9:07 PM   Result Value Ref Range    POC Glucose 198 (H) 70 - 110 mg/dL   CBC with Auto Differential    Collection Time: 08/01/24  3:33 AM   Result Value Ref Range    WBC 17.3 (H) 4.6 - 13.2 K/uL    RBC 3.74 (L) 4.20 - 5.30 M/uL    Hemoglobin 11.1 (L) 12.0 - 16.0 g/dL    Hematocrit 33.6 (L) 35.0 - 45.0 %    MCV 89.8 78.0 - 100.0 FL    MCH 29.7 24.0 - 34.0 PG    MCHC 33.0 31.0 - 37.0 g/dL    RDW 14.8 (H) 11.6 - 14.5 %    Platelets 416 135 - 420 K/uL    MPV 10.8 9.2 - 11.8 FL    Nucleated RBCs 0.0 0  WBC    nRBC 0.00 0.00 - 0.01 K/uL    Neutrophils % 82 (H) 40 - 73 %    Lymphocytes % 12 (L)  21 - 52 %    Monocytes % 6 3 - 10 %    Eosinophils % 0 0 - 5 %    Basophils % 0 0 - 2 %    Immature Granulocytes % 0 0.0 - 0.5 %    Neutrophils Absolute 14.1 (H) 1.8 - 8.0 K/UL    Lymphocytes Absolute 2.1 0.9 - 3.6 K/UL    Monocytes Absolute 1.0 0.05 - 1.2 K/UL    Eosinophils Absolute 0.0 0.0 - 0.4 K/UL    Basophils Absolute 0.0 0.0 - 0.1 K/UL    Immature Granulocytes Absolute 0.1 (H) 0.00 - 0.04 K/UL    Differential Type AUTOMATED     POCT Glucose    Collection Time: 08/01/24  6:16 AM   Result Value Ref Range    POC Glucose 167 (H) 70 - 110 mg/dL       Assessment:   Genevieve Castillo is a 38 y.o. female,  day 1 status post   Laparoscopic sleeve gastrectomy with robotic assist    Condition: Good    Plan:   -BP control   -Lovenox teaching   -Ambulate every hour  -Encourage use of incentive spirometer, deep breathe/cough   -Pain managed prior to d/c   -Nausea managed prior to d/c   -Advance to Clear liquid Bariatric Surgery Diet, if able to tolerate clear liquid diet (with pro shake) 4oz per hour for 3 hours prior to d/c    If patient continues to progress, may d/c home later today.     PÉREZ Aguirre - NP  7:42 AM  8/1/2024

## 2024-08-01 NOTE — PROGRESS NOTES
Patient was educated on all discharge instructions below. He/she understood and was provided a copy. He/she knows who to call for any issues post discharge.   Hydration  Hydration is your NUMBER ONE priority.  Dehydration is the most common reason for readmission to the hospital. Dehydration occurs when  your body does not get enough fluid to keep it functioning at its best. Your body also requires fluid  to burn its stored fat calories for energy.  Carry a bottle of water with you all day, even when you are away from home; remind yourself to  drink even if you don’t feel thirsty. Drinking 64 ounces of fluid is your daily goal. You can tell if  you’re getting enough fluid if you’re making clear, light-colored urine five to 10 times per day.  Signs of dehydration can be thirst, headache, hard stools or dizziness upon sitting or standing up.  You should contact your surgeon’s office if you are unable to drink enough fluid to stay hydrated.  --     General Care after Surgery   No lifting over 15 pounds for four weeks.   No driving while taking the pain medication (about seven to 10 days).   No tub baths, swimming or hot tubs until incisions are healed (about two weeks).   You may shower. Clean incisions daily /gently with soap and check incisions for signs of infection:  -- Redness around incision.  -- Swelling at site.  -- Drainage with an foul odor (pus).  -- Increase tenderness around incision.   Take your temperature and resting pulse in the morning and evening. Record on tracking form  given to you. Call if your temperature is greater than 101 or your pulse rate is greater than 115.   Please contact your surgeon if you are having excessive abdominal pain (that lasts longer than  four hours and does not improve with prescribed pain medication), vomiting or shortness of breath.   Get up and move -- do not sit in one place for more than an hour.   You need to WALK (EXERCISE) for 30  separately.   Sublingual Vitamin B-12: 1,000 micrograms daily.   Iron for menstruating women or patients with a history of low iron: 65 milligrams daily.  We recommend going to www.[employer] and purchasing iron from there.  The lemon-lime has 60 milligrams. This iron is better absorbed than over-the-counter iron.    Clear Liquid Log  Getting your fluid in is top priority during this week.  Fluids (MINIUM of 64 ounces per day):  ? 8 oz. ? 8 oz. ? 8 oz. ? 8 oz. ? 8 oz. ? 8 oz.  ? 8 oz. ? 8 oz. ? 8 oz. ? 8 oz. ? 8 oz. ? 8 oz.  Flintstones Complete Chewable: ? a.m. ? p.m.  Calcium Citrate (1,500 milligrams/day):  Pill form  ? Two crushed pills (morning) ? Two crushed pills (afternoon) ? Two crushed pills (evening)  OR Upcal D (powder)  ? One pack/scoop ? One pack/scoop ? One pack/scoop  OR Celebrate Chewable Vitamins (500 mg each) or Bariatric Advantage Chewables (500 mg)  ? One chewable (morning) ? One chewable (afternoon) ? One chewable (evening)  OR Liquid Calcium Citrate  ? 1 tbsp. Calcium Citrate ? 1 tbsp. Calcium Citrate ? 1 tbsp. Calcium Citrate  Vitamin D3: ? 5,000 IU daily.  Vitamin B-12: ? 1,000 micrograms per day.  Iron (menstruating women or patients with a history of low iron):  ? 60 milligrams per day from B[employer]vant[employer].  Protein drinks (protein drinks should be under 3 grams of sugar and 3 grams of fat).  Protein shake (60 grams per day): ? a.m. ? p.m.  Exercise: ? 30 to 40 minutes per day.    Bariatric Soft and Moist Diet Shopping List   alcium Citrate (1,500 milligrams/day):  Pill form  ? Two crushed pills (morning) ? Two crushed pills (afternoon) ? Two crushed pills (evening)  OR Upcal D (powder)  ? One pack/scoop ? One pack/scoop ? One pack/scoop  OR Celebrate Chewable Vitamins (500 mg each) or Bariatric Advantage Chewables (500 mg)  ? One chewable (morning) ? One chewable (afternoon) ? One chewable (evening)  OR Liquid Calcium Citrate  ? 1 tbsp. Calcium Citrate ? 1 tbsp. Calcium

## 2024-08-01 NOTE — DISCHARGE INSTRUCTIONS
DISCHARGE SUMMARY from Nurse    PATIENT INSTRUCTIONS:    After general anesthesia or intravenous sedation, for 24 hours or while taking prescription Narcotics:  Limit your activities  Do not drive and operate hazardous machinery  Do not make important personal or business decisions  Do  not drink alcoholic beverages  If you have not urinated within 8 hours after discharge, please contact your surgeon on call.    Report the following to your surgeon:  Excessive pain, swelling, redness or odor of or around the surgical area  Temperature over 100.5  Nausea and vomiting lasting longer than 4 hours or if unable to take medications  Any signs of decreased circulation or nerve impairment to extremity: change in color, persistent  numbness, tingling, coldness or increase pain  Any questions    What to do at Home:  Recommended activity: activity as tolerated,     If you experience any of the following symptoms Refer to Blue Booklet, please follow up with Dr Cooper.    *  Please give a list of your current medications to your Primary Care Provider.    *  Please update this list whenever your medications are discontinued, doses are      changed, or new medications (including over-the-counter products) are added.    *  Please carry medication information at all times in case of emergency situations.    These are general instructions for a healthy lifestyle:    No smoking/ No tobacco products/ Avoid exposure to second hand smoke  Surgeon General's Warning:  Quitting smoking now greatly reduces serious risk to your health.    Obesity, smoking, and sedentary lifestyle greatly increases your risk for illness    A healthy diet, regular physical exercise & weight monitoring are important for maintaining a healthy lifestyle    You may be retaining fluid if you have a history of heart failure or if you experience any of the following symptoms:  Weight gain of 3 pounds or more overnight or 5 pounds in a week, increased swelling in our  understanding.  Discharge medications reviewed with the Rich meds reviewed with:[id number]} and appropriate educational materials and side effects teaching were provided.  ___________________________________________________________________________________________________________________________________

## 2024-08-01 NOTE — PROGRESS NOTES
Patient was educated on all discharge instructions below. He/she understood and was provided a copy. He/she knows who to call for any issues post discharge.   Hydration  Hydration is your NUMBER ONE priority.  Dehydration is the most common reason for readmission to the hospital. Dehydration occurs when  your body does not get enough fluid to keep it functioning at its best. Your body also requires fluid  to burn its stored fat calories for energy.  Carry a bottle of water with you all day, even when you are away from home; remind yourself to  drink even if you don’t feel thirsty. Drinking 64 ounces of fluid is your daily goal. You can tell if  you’re getting enough fluid if you’re making clear, light-colored urine five to 10 times per day.  Signs of dehydration can be thirst, headache, hard stools or dizziness upon sitting or standing up.  You should contact your surgeon’s office if you are unable to drink enough fluid to stay hydrated.  --   [facility]  General Care after Surgery   No lifting over 15 pounds for four weeks.   No driving while taking the pain medication (about seven to 10 days).   No tub baths, swimming or hot tubs until incisions are healed (about two weeks).   You may shower. Clean incisions daily /gently with soap and check incisions for signs of infection:  -- Redness around incision.  -- Swelling at site.  -- Drainage with an foul odor (pus).  -- Increase tenderness around incision.   Take your temperature and resting pulse in the morning and evening. Record on tracking form  given to you. Call if your temperature is greater than 101 or your pulse rate is greater than 115.   Please contact your surgeon if you are having excessive abdominal pain (that lasts longer than  four hours and does not improve with prescribed pain medication), vomiting or shortness of breath.   Get up and move -- do not sit in one place for more than an hour.   You need to WALK (EXERCISE) for 30

## 2024-08-08 ENCOUNTER — FOLLOWUP TELEPHONE ENCOUNTER (OUTPATIENT)
Facility: HOSPITAL | Age: 39
End: 2024-08-08

## 2024-08-12 ENCOUNTER — OFFICE VISIT (OUTPATIENT)
Age: 39
End: 2024-08-12

## 2024-08-12 VITALS
RESPIRATION RATE: 16 BRPM | WEIGHT: 293 LBS | BODY MASS INDEX: 48.82 KG/M2 | DIASTOLIC BLOOD PRESSURE: 93 MMHG | OXYGEN SATURATION: 99 % | HEART RATE: 65 BPM | SYSTOLIC BLOOD PRESSURE: 134 MMHG | TEMPERATURE: 97.5 F | HEIGHT: 65 IN

## 2024-08-12 DIAGNOSIS — Z13.21 MALNUTRITION SCREEN: ICD-10-CM

## 2024-08-12 DIAGNOSIS — Z98.84 BARIATRIC SURGERY STATUS: ICD-10-CM

## 2024-08-12 DIAGNOSIS — K21.9 GASTROESOPHAGEAL REFLUX DISEASE, UNSPECIFIED WHETHER ESOPHAGITIS PRESENT: ICD-10-CM

## 2024-08-12 DIAGNOSIS — E66.01 MORBID OBESITY (HCC): ICD-10-CM

## 2024-08-12 DIAGNOSIS — E11.65 TYPE 2 DIABETES MELLITUS WITH HYPERGLYCEMIA, WITHOUT LONG-TERM CURRENT USE OF INSULIN (HCC): ICD-10-CM

## 2024-08-12 DIAGNOSIS — Z98.84 BARIATRIC SURGERY STATUS: Primary | ICD-10-CM

## 2024-08-12 DIAGNOSIS — E28.2 PCOS (POLYCYSTIC OVARIAN SYNDROME): ICD-10-CM

## 2024-08-12 PROCEDURE — 99024 POSTOP FOLLOW-UP VISIT: CPT | Performed by: SURGERY

## 2024-08-12 RX ORDER — URSODIOL 200 MG/1
200 CAPSULE ORAL SEE ADMIN INSTRUCTIONS
Qty: 90 CAPSULE | Refills: 5 | Status: SHIPPED | OUTPATIENT
Start: 2024-08-12

## 2024-08-12 NOTE — PROGRESS NOTES
Bariatric Postoperative Nurse Note      Genevieve Castillo is a 39 y.o. female status post laparoscopic sleeve gastrectomy performed on 7/31/2024.    Two Week Post-Op only  -Fevers/chills? No  -Chest pain? No  -Shortness of breath? No  -Peripheral swelling (arms/legs)? No  -# of total opioid doses taken postop? 1  -ER visits/readmission? No    All Post-Ops (including two weeks)  -# of grams of protein daily? 60 grams  -sources of protein? Shakes and beans  -# of oz of sugar free fluids from all sources daily? 64 oz  -Nausea? No  -Vomiting? No  -Difficulty swallow/food sticking? Yes  -Heartburn/regurgitation? Yes  -Character of bowel movements (diarrhea/constipation/bloody stools?) regular  -Which multivitamin product are you taking? Flintstones   -What dose and how frequently are you taking calcium citrate? no  - from any iron-containing multivitamin by 2 hours? No  -Ulcer risk exposures:   NSAID No  Tobacco No  Alcohol No  Steroids No  -Minutes of physical activity and what type? walking 20 minutes 7 days a week.

## 2024-08-12 NOTE — PROGRESS NOTES
Bariatric Surgery Post Op Progress Note    CC: follow up r-LSG  Subjective:     Genevieve Castillo  is a 39 y.o. female who presents for follow-up after r-LSG.. She has lost a total of 24 pounds since surgery. Body mass index is 52.25 kg/m²..     Path benign    -Fevers/chills? No  -Chest pain? No  -Shortness of breath? No  -Peripheral swelling (arms/legs)? No  -# of total opioid doses taken postop? 1  -ER visits/readmission? No     All Post-Ops (including two weeks)  -# of grams of protein daily? 60 grams  -sources of protein? Shakes, eggs and beans  -# of oz of sugar free fluids from all sources daily? 64 oz  -Nausea? No  -Vomiting? No  -Difficulty swallow/food sticking? Yes  -Heartburn/regurgitation? No  -Character of bowel movements (diarrhea/constipation/bloody stools?) regular  -Which multivitamin product are you taking? Flintstones   -What dose and how frequently are you taking calcium citrate? no  - from any iron-containing multivitamin by 2 hours? No  -Ulcer risk exposures:   NSAID No  Tobacco No  Alcohol No  Steroids No  -Minutes of physical activity and what type? walking 20 minutes 7 days a week.     Changes in their medical history and medications have been reviewed.    Comorbidities:   MARIO, DM2, PCOS, GERD, weight related arthropathy, HTN    Patient Active Problem List   Diagnosis    Overactive bladder    Arthritic-like pain    Morbid obesity with body mass index of 50.0-59.9 in adult ([facility])    Morbid obesity ([facility])    Uses birth control    Infertility associated with anovulation    Sprain of cruciate ligament of knee    NEHEMIAS (generalized anxiety disorder)    PCOS (polycystic ovarian syndrome)    Type 2 diabetes mellitus with hyperglycemia, without long-term current use of insulin ([facility])    Body mass index 50.0-59.9, adult ([facility])    Controlled diabetes mellitus with hyperglycemia, without long-term current use of insulin ([facility])    Other specified counseling    Morbid (severe) obesity due to excess

## 2024-09-11 ENCOUNTER — CLINICAL DOCUMENTATION (OUTPATIENT)
Facility: HOSPITAL | Age: 39
End: 2024-09-11

## 2024-09-11 ENCOUNTER — HOSPITAL ENCOUNTER (OUTPATIENT)
Facility: HOSPITAL | Age: 39
Discharge: HOME | End: 2024-09-14

## 2024-11-08 ENCOUNTER — HOSPITAL ENCOUNTER (OUTPATIENT)
Facility: HOSPITAL | Age: 39
Discharge: HOME | End: 2024-11-11

## 2024-11-08 LAB — SENTARA SPECIMEN COLLECTION: NORMAL

## 2024-11-08 PROCEDURE — 99001 SPECIMEN HANDLING PT-LAB: CPT

## 2024-11-12 ENCOUNTER — OFFICE VISIT (OUTPATIENT)
Age: 39
End: 2024-11-12
Payer: COMMERCIAL

## 2024-11-12 VITALS
OXYGEN SATURATION: 98 % | WEIGHT: 286 LBS | TEMPERATURE: 97.6 F | BODY MASS INDEX: 47.65 KG/M2 | RESPIRATION RATE: 18 BRPM | SYSTOLIC BLOOD PRESSURE: 115 MMHG | HEIGHT: 65 IN | DIASTOLIC BLOOD PRESSURE: 77 MMHG

## 2024-11-12 DIAGNOSIS — E11.65 TYPE 2 DIABETES MELLITUS WITH HYPERGLYCEMIA, WITHOUT LONG-TERM CURRENT USE OF INSULIN (HCC): ICD-10-CM

## 2024-11-12 DIAGNOSIS — Z90.3 S/P GASTRIC SLEEVE PROCEDURE: ICD-10-CM

## 2024-11-12 DIAGNOSIS — K91.2 POSTGASTRECTOMY MALABSORPTION: Primary | ICD-10-CM

## 2024-11-12 DIAGNOSIS — K91.2 POSTGASTRECTOMY MALABSORPTION: ICD-10-CM

## 2024-11-12 DIAGNOSIS — E66.01 MORBID OBESITY: ICD-10-CM

## 2024-11-12 DIAGNOSIS — Z90.3 POSTGASTRECTOMY MALABSORPTION: ICD-10-CM

## 2024-11-12 DIAGNOSIS — Z76.89 ENCOUNTER FOR WEIGHT MANAGEMENT: ICD-10-CM

## 2024-11-12 DIAGNOSIS — Z90.3 POSTGASTRECTOMY MALABSORPTION: Primary | ICD-10-CM

## 2024-11-12 DIAGNOSIS — K59.00 CONSTIPATION, UNSPECIFIED CONSTIPATION TYPE: ICD-10-CM

## 2024-11-12 PROCEDURE — 99214 OFFICE O/P EST MOD 30 MIN: CPT | Performed by: REGISTERED NURSE

## 2024-11-12 RX ORDER — PHENTERMINE HYDROCHLORIDE 37.5 MG/1
37.5 TABLET ORAL
Qty: 30 TABLET | Refills: 0 | Status: SHIPPED | OUTPATIENT
Start: 2024-11-12 | End: 2024-12-12

## 2024-11-12 ASSESSMENT — ENCOUNTER SYMPTOMS
ANAL BLEEDING: 0
DIARRHEA: 0
CONSTIPATION: 1
BLOOD IN STOOL: 0
CHEST TIGHTNESS: 0
ABDOMINAL DISTENTION: 0
VOMITING: 0
RECTAL PAIN: 0
NAUSEA: 0
SHORTNESS OF BREATH: 0
ABDOMINAL PAIN: 0

## 2024-11-12 NOTE — PROGRESS NOTES
Bariatric Postoperative Nurse Note    Genevieve Castillo is a 39 y.o. female status post laparoscopic sleeve gastrectomy performed on 07/31/2024.    All Post-Ops (including two weeks)  -# of grams of protein daily? 60 g  -sources of protein? Protein shakes, Chicken  -# of oz of sugar free fluids from all sources daily? 64 oz daily.  -Nausea? No  -Vomiting? No  -Difficulty swallow/food sticking? No  -Heartburn/regurgitation? No  -Character of bowel movements (diarrhea/constipation/bloody stools?) regular  -Which multivitamin product are you taking?  Bariatric Fusion    -What dose and how frequently are you taking calcium citrate? 500mg three times daily.  - from any iron-containing multivitamin by 2 hours? Yes  -Ulcer risk exposures:   NSAID No  Tobacco No  Alcohol No  Steroids No  -Minutes of physical activity and what type? Walking 30 mins daily.  
lb)   Height: 1.651 m (5' 5\")      Physical Exam  Vitals and nursing note reviewed.   Constitutional:       General: She is not in acute distress.     Appearance: She is obese.   HENT:      Head: Normocephalic and atraumatic.   Eyes:      Pupils: Pupils are equal, round, and reactive to light.   Cardiovascular:      Rate and Rhythm: Normal rate and regular rhythm.   Pulmonary:      Effort: Pulmonary effort is normal.      Breath sounds: Normal breath sounds.   Abdominal:      General: Bowel sounds are normal. There is no distension.      Palpations: Abdomen is soft. There is no mass.      Tenderness: There is no abdominal tenderness.   Musculoskeletal:         General: Normal range of motion.   Skin:     General: Skin is warm and dry.   Neurological:      Mental Status: She is alert and oriented to person, place, and time.   Psychiatric:         Mood and Affect: Mood normal.         Behavior: Behavior normal.         Thought Content: Thought content normal.     Labs:   Recent Results (from the past 672 hour(s))   [facility] specimen collection    Collection Time: 11/08/24  7:40 AM   Result Value Ref Range    [facility] Specimen Collection Specimens collected/sent to [facility]       Assessment:  3 months s/p laparoscopic sleeve gastrectomy  with a total weight loss of 52lbs to date, doing well.    Plan:   Labs were reviewed, no significant abnormalities.  Pt was instructed to continue recommended bariatric vitamin supplementation. Increase protein intake.     Agreeable to complement diet and exercise with an AOM. Took phentermine in the past with good results. Pt to start with 37.5 mg tablet--1/2 tab early am--add 2nd half after 1st week if experiences increased hunger, but no later than noon to avoid insomnia. Order 30 tabs, no RF--pt understands will need monthly visits. Reviewed potential SE including: elevated HR, BP, increased anxiety, insomnia, thirst, and constipation.    Constipation    It typically can be prevented

## 2024-11-14 LAB
A/G RATIO: 2.2 RATIO (ref 1.1–2.6)
ALBUMIN: 4.3 G/DL (ref 3.5–5)
ALP BLD-CCNC: 79 U/L (ref 25–115)
ALT SERPL-CCNC: 14 U/L (ref 5–40)
ANION GAP SERPL CALCULATED.3IONS-SCNC: 9 MMOL/L (ref 3–15)
AST SERPL-CCNC: 10 U/L (ref 10–37)
BASOPHILS ABSOLUTE: 0 K/UL (ref 0–0.2)
BASOPHILS RELATIVE PERCENT: 0 % (ref 0–2)
BILIRUB SERPL-MCNC: 0.3 MG/DL (ref 0.2–1.2)
BUN BLDV-MCNC: 20 MG/DL (ref 6–22)
CALCIUM SERPL-MCNC: 9.2 MG/DL (ref 8.4–10.5)
CHLORIDE BLD-SCNC: 104 MMOL/L (ref 98–110)
CO2: 25 MMOL/L (ref 20–32)
CREAT SERPL-MCNC: 0.5 MG/DL (ref 0.5–1.2)
EOSINOPHIL # BLD: 2 % (ref 0–6)
EOSINOPHILS ABSOLUTE: 0.2 K/UL (ref 0–0.5)
ESTIMATED AVERAGE GLUCOSE: 118 MG/DL (ref 91–123)
FERRITIN: 25 NG/ML (ref 10–291)
FOLATE: >20 NG/ML
GFR, ESTIMATED: >60 ML/MIN/1.73 SQ.M.
GLOBULIN: 2 G/DL (ref 2–4)
GLUCOSE: 93 MG/DL (ref 70–99)
HBA1C MFR BLD: 5.7 % (ref 4.8–5.6)
HCT VFR BLD CALC: 36.2 % (ref 35.1–46.5)
HEMOGLOBIN: 10.8 G/DL (ref 11.7–15.5)
IRON: 62 MCG/DL (ref 30–160)
LYMPHOCYTES # BLD: 23 % (ref 20–45)
LYMPHOCYTES ABSOLUTE: 2.2 K/UL (ref 1–4.8)
MCH RBC QN AUTO: 29 PG (ref 26–34)
MCHC RBC AUTO-ENTMCNC: 30 G/DL (ref 31–36)
MCV RBC AUTO: 98 FL (ref 80–99)
MONOCYTES ABSOLUTE: 0.6 K/UL (ref 0.1–1)
MONOCYTES: 6 % (ref 3–12)
NEUTROPHILS ABSOLUTE: 6.5 K/UL (ref 1.8–7.7)
NEUTROPHILS: 68 % (ref 40–75)
PDW BLD-RTO: 14.7 % (ref 10–15.5)
PLATELET # BLD: 413 K/UL (ref 140–440)
PMV BLD AUTO: 11 FL (ref 9–13)
POTASSIUM SERPL-SCNC: 4.5 MMOL/L (ref 3.5–5.5)
RBC # BLD: 3.71 M/UL (ref 3.8–5.2)
SODIUM BLD-SCNC: 138 MMOL/L (ref 133–145)
THIAMINE BLOOD: 115 NMOL/L (ref 78–185)
TOTAL PROTEIN: 6.3 G/DL (ref 6.4–8.3)
VITAMIN B-12: 451 PG/ML (ref 211–911)
VITAMIN D 25-HYDROXY: 33.5 NG/ML (ref 32–100)
WBC # BLD: 9.5 K/UL (ref 4–11)

## 2024-12-02 ENCOUNTER — HOSPITAL ENCOUNTER (OUTPATIENT)
Facility: HOSPITAL | Age: 39
Discharge: HOME | End: 2024-12-05
Payer: COMMERCIAL

## 2024-12-02 DIAGNOSIS — N92.1 EXCESSIVE AND FREQUENT MENSTRUATION WITH IRREGULAR CYCLE: ICD-10-CM

## 2024-12-02 PROCEDURE — 93975 VASCULAR STUDY: CPT

## 2024-12-12 ENCOUNTER — TELEMEDICINE (OUTPATIENT)
Age: 39
End: 2024-12-12

## 2024-12-12 VITALS — WEIGHT: 276 LBS | BODY MASS INDEX: 45.98 KG/M2 | HEIGHT: 65 IN

## 2024-12-12 DIAGNOSIS — Z76.89 ENCOUNTER FOR WEIGHT MANAGEMENT: ICD-10-CM

## 2024-12-12 DIAGNOSIS — E11.65 TYPE 2 DIABETES MELLITUS WITH HYPERGLYCEMIA, WITHOUT LONG-TERM CURRENT USE OF INSULIN (HCC): ICD-10-CM

## 2024-12-12 DIAGNOSIS — Z90.3 S/P GASTRIC SLEEVE PROCEDURE: ICD-10-CM

## 2024-12-12 DIAGNOSIS — K91.2 POSTGASTRECTOMY MALABSORPTION: Primary | ICD-10-CM

## 2024-12-12 DIAGNOSIS — E66.01 MORBID OBESITY: ICD-10-CM

## 2024-12-12 DIAGNOSIS — Z79.899 FOLLOW-UP ENCOUNTER INVOLVING MEDICATION: ICD-10-CM

## 2024-12-12 DIAGNOSIS — Z90.3 POSTGASTRECTOMY MALABSORPTION: Primary | ICD-10-CM

## 2024-12-12 ASSESSMENT — ENCOUNTER SYMPTOMS
SHORTNESS OF BREATH: 0
ABDOMINAL PAIN: 0
RECTAL PAIN: 0
ABDOMINAL DISTENTION: 0
CHEST TIGHTNESS: 0
BLOOD IN STOOL: 0
DIARRHEA: 0
NAUSEA: 0
ANAL BLEEDING: 0
VOMITING: 0

## 2024-12-12 NOTE — PROGRESS NOTES
Status: She is alert and oriented to person, place, and time.   Psychiatric:         Mood and Affect: Mood normal.         Behavior: Behavior normal.         Thought Content: Thought content normal.     Labs:   No results found for this or any previous visit (from the past 672 hour(s)).    Assessment:  4 months s/p laparoscopic sleeve gastrectomy  with a total weight loss of 62lbs to date, doing well.    Plan:   Labs were previously reviewed, no significant abnormalities.  Pt was instructed to continue recommended bariatric vitamin supplementation. Increase protein intake.     Phentermine effective for obesity management. Refill phentermine 37.5 mg tab, #30 R1--pt understands will need routine visits. Reviewed potential SE including: elevated HR, BP, increased anxiety, insomnia, thirst, and constipation.    We reviewed the components of a successful postoperative course including requirement for a high protein, low carbohydrate diet, 64 oz a day of zero calorie liquids, daily vitamin supplementation, daily exercise (150 mis/week), regular follow-up, and participation in support groups.    Refer to registered dietitian for more detailed medical nutrition therapy as needed.     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 Diagnoses of Morbid obesity, Type 2 diabetes mellitus with hyperglycemia, without long-term current use of insulin ([facility]), BMI 45.0-49.9, adult, S/P gastric sleeve procedure, Encounter for weight management, and Follow-up encounter involving medication were also pertinent to this visit.     Return in about 2 months (around 2/12/2025) for Medication follow up, Weight management, 6-month follow up.     with labs, sooner as needed.  Liseth Martinez, PÉREZ - JAMAR Castillo, was evaluated through a synchronous (real-time) audio-video encounter. The patient (or guardian if applicable) is aware that this is a billable service, which includes applicable co-pays. This Virtual

## 2024-12-14 RX ORDER — PHENTERMINE HYDROCHLORIDE 37.5 MG/1
37.5 TABLET ORAL
Qty: 30 TABLET | Refills: 1 | Status: SHIPPED | OUTPATIENT
Start: 2024-12-14 | End: 2025-02-12

## 2024-12-15 DIAGNOSIS — Z90.3 POSTGASTRECTOMY MALABSORPTION: ICD-10-CM

## 2024-12-15 DIAGNOSIS — E66.01 MORBID OBESITY: ICD-10-CM

## 2024-12-15 DIAGNOSIS — K91.2 POSTGASTRECTOMY MALABSORPTION: ICD-10-CM

## 2024-12-15 DIAGNOSIS — Z76.89 ENCOUNTER FOR WEIGHT MANAGEMENT: ICD-10-CM

## 2024-12-15 DIAGNOSIS — E11.65 TYPE 2 DIABETES MELLITUS WITH HYPERGLYCEMIA, WITHOUT LONG-TERM CURRENT USE OF INSULIN (HCC): ICD-10-CM

## 2024-12-15 DIAGNOSIS — Z90.3 S/P GASTRIC SLEEVE PROCEDURE: ICD-10-CM

## 2025-02-07 LAB
A/G RATIO: 1.7 RATIO (ref 1.1–2.6)
ALBUMIN: 4.3 G/DL (ref 3.5–5)
ALP BLD-CCNC: 92 U/L (ref 25–115)
ALT SERPL-CCNC: 16 U/L (ref 5–40)
ANION GAP SERPL CALCULATED.3IONS-SCNC: 11 MMOL/L (ref 3–15)
AST SERPL-CCNC: 12 U/L (ref 10–37)
BASOPHILS # BLD: 1 % (ref 0–2)
BASOPHILS ABSOLUTE: 0.1 K/UL (ref 0–0.2)
BILIRUB SERPL-MCNC: 0.2 MG/DL (ref 0.2–1.2)
BUN BLDV-MCNC: 18 MG/DL (ref 6–22)
CALCIUM SERPL-MCNC: 9.4 MG/DL (ref 8.4–10.5)
CHLORIDE BLD-SCNC: 99 MMOL/L (ref 98–110)
CHOLESTEROL, TOTAL: 128 MG/DL (ref 110–200)
CHOLESTEROL/HDL RATIO: 2.4 (ref 0–5)
CO2: 26 MMOL/L (ref 20–32)
CREAT SERPL-MCNC: 0.5 MG/DL (ref 0.5–1.2)
EOSINOPHIL # BLD: 2 % (ref 0–6)
EOSINOPHILS ABSOLUTE: 0.2 K/UL (ref 0–0.5)
ESTIMATED AVERAGE GLUCOSE: 116 MG/DL (ref 91–123)
FOLATE: >20 NG/ML
GFR, ESTIMATED: >60 ML/MIN/1.73 SQ.M.
GLOBULIN: 2.5 G/DL (ref 2–4)
GLUCOSE: 90 MG/DL (ref 70–99)
HBA1C MFR BLD: 5.7 % (ref 4.8–5.6)
HCT VFR BLD CALC: 33.5 % (ref 35.1–46.5)
HDLC SERPL-MCNC: 54 MG/DL
HEMOGLOBIN: 10.9 G/DL (ref 11.7–15.5)
IRON: 42 MCG/DL (ref 30–160)
LDL CHOLESTEROL: 55 MG/DL (ref 50–99)
LDL/HDL RATIO: 1
LYMPHOCYTES # BLD: 26 % (ref 20–45)
LYMPHOCYTES ABSOLUTE: 3.4 K/UL (ref 1–4.8)
MCH RBC QN AUTO: 29 PG (ref 26–34)
MCHC RBC AUTO-ENTMCNC: 33 G/DL (ref 31–36)
MCV RBC AUTO: 90 FL (ref 80–99)
MONOCYTES ABSOLUTE: 0.8 K/UL (ref 0.1–1)
MONOCYTES: 6 % (ref 3–12)
NEUTROPHILS ABSOLUTE: 8.6 K/UL (ref 1.8–7.7)
NEUTROPHILS SEGMENTED: 65 % (ref 40–75)
NON-HDL CHOLESTEROL: 74 MG/DL
PDW BLD-RTO: 13.9 % (ref 10–15.5)
PLATELET # BLD: 463 K/UL (ref 140–440)
PMV BLD AUTO: 10.4 FL (ref 9–13)
POTASSIUM SERPL-SCNC: 4 MMOL/L (ref 3.5–5.5)
RBC # BLD: 3.72 M/UL (ref 3.8–5.2)
SODIUM BLD-SCNC: 136 MMOL/L (ref 133–145)
TOTAL PROTEIN: 6.8 G/DL (ref 6.4–8.3)
TRIGL SERPL-MCNC: 91 MG/DL (ref 40–149)
VITAMIN B-12: 694 PG/ML (ref 211–911)
VLDLC SERPL CALC-MCNC: 18 MG/DL (ref 8–30)
WBC # BLD: 13.1 K/UL (ref 4–11)

## 2025-02-12 ENCOUNTER — OFFICE VISIT (OUTPATIENT)
Age: 40
End: 2025-02-12

## 2025-02-12 VITALS
DIASTOLIC BLOOD PRESSURE: 86 MMHG | WEIGHT: 270 LBS | HEIGHT: 65 IN | HEART RATE: 96 BPM | BODY MASS INDEX: 44.98 KG/M2 | SYSTOLIC BLOOD PRESSURE: 131 MMHG

## 2025-02-12 DIAGNOSIS — E66.01 MORBID OBESITY: ICD-10-CM

## 2025-02-12 DIAGNOSIS — Z90.3 S/P GASTRIC SLEEVE PROCEDURE: ICD-10-CM

## 2025-02-12 DIAGNOSIS — K91.2 POSTGASTRECTOMY MALABSORPTION: ICD-10-CM

## 2025-02-12 DIAGNOSIS — E11.65 TYPE 2 DIABETES MELLITUS WITH HYPERGLYCEMIA, WITHOUT LONG-TERM CURRENT USE OF INSULIN (HCC): ICD-10-CM

## 2025-02-12 DIAGNOSIS — Z79.899 FOLLOW-UP ENCOUNTER INVOLVING MEDICATION: Primary | ICD-10-CM

## 2025-02-12 DIAGNOSIS — Z90.3 POSTGASTRECTOMY MALABSORPTION: ICD-10-CM

## 2025-02-12 DIAGNOSIS — Z76.89 ENCOUNTER FOR WEIGHT MANAGEMENT: ICD-10-CM

## 2025-02-12 LAB — THIAMINE BLOOD: 161 NMOL/L (ref 78–185)

## 2025-02-12 RX ORDER — CYANOCOBALAMIN 1000 UG/ML
1000 INJECTION, SOLUTION INTRAMUSCULAR; SUBCUTANEOUS ONCE
Status: COMPLETED | OUTPATIENT
Start: 2025-02-12 | End: 2025-02-12

## 2025-02-12 RX ORDER — PHENTERMINE HYDROCHLORIDE 37.5 MG/1
37.5 TABLET ORAL
Qty: 30 TABLET | Refills: 1 | Status: SHIPPED | OUTPATIENT
Start: 2025-02-12 | End: 2025-04-13

## 2025-02-12 RX ADMIN — CYANOCOBALAMIN 1000 MCG: 1000 INJECTION, SOLUTION INTRAMUSCULAR; SUBCUTANEOUS at 10:26

## 2025-02-12 ASSESSMENT — ENCOUNTER SYMPTOMS
NAUSEA: 0
DIARRHEA: 0
BLOOD IN STOOL: 0
ABDOMINAL PAIN: 0
CHEST TIGHTNESS: 0
VOMITING: 0
ABDOMINAL DISTENTION: 0
ANAL BLEEDING: 0
RECTAL PAIN: 0
CONSTIPATION: 0
SHORTNESS OF BREATH: 0

## 2025-02-12 NOTE — PROGRESS NOTES
Bariatric Postoperative Nurse Note    Genevieve Castillo is a 39 y.o. female status post laparoscopic sleeve gastrectomy performed on 07/31/2024.    All Post-Ops (including two weeks)  -# of grams of protein daily? 60 g  -sources of protein? Protein shakes, Chicken  -# of oz of sugar free fluids from all sources daily? 64 oz daily.  -Nausea? No  -Vomiting? No  -Difficulty swallow/food sticking? No  -Heartburn/regurgitation? No  -Character of bowel movements (diarrhea/constipation/bloody stools?) regular  -Which multivitamin product are you taking?  Bariatric Fusion    -What dose and how frequently are you taking calcium citrate? 500mg three times daily.  - from any iron-containing multivitamin by 2 hours? Yes  -Ulcer risk exposures:   NSAID No  Tobacco No  Alcohol No  Steroids No  -Minutes of physical activity and what type? Walking 30 mins daily.  
Upper Arm   Position: Sitting   Pulse: 96   Weight: 122.5 kg (270 lb)   Height: 1.651 m (5' 5\")     Physical Exam  Nursing note reviewed.   Constitutional:       General: She is not in acute distress.     Appearance: She is obese.   HENT:      Head: Normocephalic and atraumatic.   Pulmonary:      Effort: Pulmonary effort is normal.   Neurological:      Mental Status: She is alert and oriented to person, place, and time.   Psychiatric:         Mood and Affect: Mood normal.         Behavior: Behavior normal.         Thought Content: Thought content normal.       Labs:   Recent Results (from the past 672 hour(s))   Hemoglobin A1C    Collection Time: 02/07/25 11:41 AM   Result Value Ref Range    Hemoglobin A1C 5.7 (H) 4.8 - 5.6 %    Estimated Avg Glucose 116 91 - 123 mg/dL   CBC with Auto Differential    Collection Time: 02/07/25 11:41 AM   Result Value Ref Range    WBC 13.1 (H) 4.0 - 11.0 K/uL    RBC 3.72 (L) 3.80 - 5.20 M/uL    Hemoglobin 10.9 (L) 11.7 - 15.5 g/dL    Hematocrit 33.5 (L) 35.1 - 46.5 %    MCV 90 80 - 99 fL    MCH 29 26 - 34 pg    MCHC 33 31 - 36 g/dL    RDW 13.9 10.0 - 15.5 %    Platelets 463 (H) 140 - 440 K/uL    MPV 10.4 9.0 - 13.0 fL    Neutrophils Segmented 65 40 - 75 %    Lymphocytes 26 20 - 45 %    Monocytes 6 3 - 12 %    Eosinophils 2 0 - 6 %    Basophils 1 0 - 2 %    Neutrophils Absolute 8.6 (H) 1.8 - 7.7 K/uL    Lymphocytes Absolute 3.4 1.0 - 4.8 K/uL    Monocytes Absolute 0.8 0.1 - 1.0 K/uL    Eosinophils Absolute 0.2 0.0 - 0.5 K/uL    Basophils Absolute 0.1 0.0 - 0.2 K/uL   Lipid Panel    Collection Time: 02/07/25 11:41 AM   Result Value Ref Range    Cholesterol, Total 128 110 - 200 mg/dL    Triglycerides 91 40 - 149 mg/dL    HDL 54 >=40 mg/dL    Chol/HDL Ratio 2.4 0.0 - 5.0    Non-HDL Cholesterol 74 <130 mg/dL    LDL Cholesterol 55 50 - 99 mg/dL    VLDL Cholesterol Calculated 18 8 - 30 mg/dL    LDL/HDL Ratio 1.0    Comprehensive Metabolic Panel    Collection Time: 02/07/25 11:41 AM   Result

## 2025-02-17 ENCOUNTER — PATIENT MESSAGE (OUTPATIENT)
Age: 40
End: 2025-02-17

## 2025-02-17 DIAGNOSIS — E66.01 MORBID OBESITY: ICD-10-CM

## 2025-02-17 DIAGNOSIS — Z90.3 S/P GASTRIC SLEEVE PROCEDURE: ICD-10-CM

## 2025-02-17 DIAGNOSIS — K91.2 POSTGASTRECTOMY MALABSORPTION: ICD-10-CM

## 2025-02-17 DIAGNOSIS — E11.65 TYPE 2 DIABETES MELLITUS WITH HYPERGLYCEMIA, WITHOUT LONG-TERM CURRENT USE OF INSULIN (HCC): ICD-10-CM

## 2025-02-17 DIAGNOSIS — Z76.89 ENCOUNTER FOR WEIGHT MANAGEMENT: ICD-10-CM

## 2025-02-17 DIAGNOSIS — Z90.3 POSTGASTRECTOMY MALABSORPTION: ICD-10-CM

## 2025-02-17 DIAGNOSIS — Z79.899 FOLLOW-UP ENCOUNTER INVOLVING MEDICATION: ICD-10-CM

## 2025-02-18 RX ORDER — PHENTERMINE HYDROCHLORIDE 37.5 MG/1
37.5 TABLET ORAL
Qty: 30 TABLET | Refills: 1 | Status: SHIPPED | OUTPATIENT
Start: 2025-02-18 | End: 2025-04-19

## 2025-03-24 ENCOUNTER — TELEPHONE (OUTPATIENT)
Age: 40
End: 2025-03-24

## 2025-03-31 ENCOUNTER — HOSPITAL ENCOUNTER (OUTPATIENT)
Facility: HOSPITAL | Age: 40
Discharge: HOME | End: 2025-04-03

## 2025-03-31 ENCOUNTER — CLINICAL DOCUMENTATION (OUTPATIENT)
Facility: HOSPITAL | Age: 40
End: 2025-03-31

## 2025-03-31 NOTE — PROGRESS NOTES
key diet principles to the patient.  Patient was instructed to follow a 3 meal a day routine.  I have reinforced the importance of filling up on meat and vegetables and avoiding carbohydrates.  I have talked with patient about reactive hypoglycemia and although carbohydrates are not good from a weight-management point of view, they are actually very dangerous and should be avoided.    If patient is getting hungry between meals focus on meat and vegetables and a list of food choices was reviewed with patient.  Reinforced to patient the importance of being properly hydrated and the need to get 64 ounces of fluid in per day.   Reinforced to patient the need to do 30 minutes of exercise per day.      We also spent some time talking about the vitamins and the importance of taking them.  Reinforced the dosage of vitamins to patient.  Patient was reminded that the vitamins are lifelong.    Patient attended the 9 month class.  Some of the things patient was instructed to do based on their diet history is:    Food journal.  I have recommended apps like [employer] or My [employer]itness Pal.  Avoid snacking.  Eat protein based snacks, if hungry.  Stop eating sweets.  Reiterated the habit that this can lead to and building up a tolerance for sugar.  I have advised patient to go back on the liquid diet for 1 week and then start to reintroduce protein and vegetables again, limiting simple carbohydrates and sugar.  Increase exercise to a daily routine.  Patient is encouraged to add in some strength training in.    Patient was given a list of meals that focus on protein and produce.  Patient was also given the hand out on tips for success.       I have reached out to patient with feedback based on their diet history in class.  Patient is encouraged to make a one on one nutrition appointment with me if additional nutrition assistance is needed.         Muriel Anaya, RD  3/31/2025

## 2025-05-14 ENCOUNTER — OFFICE VISIT (OUTPATIENT)
Age: 40
End: 2025-05-14
Payer: COMMERCIAL

## 2025-05-14 VITALS
BODY MASS INDEX: 43.75 KG/M2 | HEIGHT: 65 IN | WEIGHT: 262.6 LBS | RESPIRATION RATE: 18 BRPM | SYSTOLIC BLOOD PRESSURE: 132 MMHG | TEMPERATURE: 97.1 F | HEART RATE: 71 BPM | OXYGEN SATURATION: 99 % | DIASTOLIC BLOOD PRESSURE: 87 MMHG

## 2025-05-14 DIAGNOSIS — Z90.3 S/P GASTRIC SLEEVE PROCEDURE: ICD-10-CM

## 2025-05-14 DIAGNOSIS — Z90.3 POSTGASTRECTOMY MALABSORPTION: Primary | ICD-10-CM

## 2025-05-14 DIAGNOSIS — Z79.899 FOLLOW-UP ENCOUNTER INVOLVING MEDICATION: ICD-10-CM

## 2025-05-14 DIAGNOSIS — E66.01 MORBID OBESITY (HCC): ICD-10-CM

## 2025-05-14 DIAGNOSIS — K91.2 POSTGASTRECTOMY MALABSORPTION: Primary | ICD-10-CM

## 2025-05-14 DIAGNOSIS — Z90.3 POSTGASTRECTOMY MALABSORPTION: ICD-10-CM

## 2025-05-14 DIAGNOSIS — K91.2 POSTGASTRECTOMY MALABSORPTION: ICD-10-CM

## 2025-05-14 DIAGNOSIS — E11.65 TYPE 2 DIABETES MELLITUS WITH HYPERGLYCEMIA, WITHOUT LONG-TERM CURRENT USE OF INSULIN (HCC): ICD-10-CM

## 2025-05-14 DIAGNOSIS — Z76.89 ENCOUNTER FOR WEIGHT MANAGEMENT: ICD-10-CM

## 2025-05-14 PROCEDURE — 99214 OFFICE O/P EST MOD 30 MIN: CPT | Performed by: REGISTERED NURSE

## 2025-05-14 PROCEDURE — 3044F HG A1C LEVEL LT 7.0%: CPT | Performed by: REGISTERED NURSE

## 2025-05-14 RX ORDER — SEMAGLUTIDE 1.34 MG/ML
INJECTION, SOLUTION SUBCUTANEOUS
COMMUNITY
Start: 2025-03-24

## 2025-05-14 RX ORDER — TOPIRAMATE 25 MG/1
25 TABLET, FILM COATED ORAL DAILY
Qty: 60 TABLET | Refills: 1 | Status: SHIPPED | OUTPATIENT
Start: 2025-05-14

## 2025-05-14 RX ORDER — PHENTERMINE HYDROCHLORIDE 37.5 MG/1
37.5 TABLET ORAL
Qty: 30 TABLET | Refills: 2 | Status: SHIPPED | OUTPATIENT
Start: 2025-05-14 | End: 2025-08-12

## 2025-05-14 ASSESSMENT — ENCOUNTER SYMPTOMS
CONSTIPATION: 1
BLOOD IN STOOL: 0
ANAL BLEEDING: 0
NAUSEA: 0
SHORTNESS OF BREATH: 0
RECTAL PAIN: 0
ABDOMINAL PAIN: 0
CHEST TIGHTNESS: 0
ABDOMINAL DISTENTION: 0
DIARRHEA: 0
VOMITING: 0

## 2025-05-14 NOTE — PROGRESS NOTES
Chief Complaint   Patient presents with    Weight Management     6 wk f/u- Sleeve: 7.31.2024     1. Have you been to the ER, urgent care clinic since your last visit?  Hospitalized since your last visit? No    2. Have you seen or consulted any other health care providers outside of the [facility] since your last visit?  Include any pap smears or colon screening. No     Bariatric Postoperative Nurse Note    Genevieve Castillo is a 39 y.o. female status post laparoscopic gastric bypass surgery performed on 7.31.2024.    All Post-Ops (including two weeks)  -# of grams of protein daily? 80  -sources of protein? Chicken, eggs, milk, cheese  -# of oz of sugar free fluids from all sources daily? 64  -Nausea? No  -Vomiting? No  -Difficulty swallow/food sticking? No  -Heartburn/regurgitation? No  -Character of bowel movements (diarrhea/constipation/bloody stools?) constipation  -Which multivitamin product are you taking? Bariatric Pal   -What dose and how frequently are you taking calcium citrate? 3 times a day  - from any iron-containing multivitamin by 2 hours? Yes  -Ulcer risk exposures:   NSAID No  Tobacco No  Alcohol No  Steroids No  -Minutes of physical activity and what type? Walking

## 2025-05-14 NOTE — PROGRESS NOTES
Bariatric Postoperative Progress Note    Chief Complaint   Patient presents with    Weight Management     MWL 6 wk f/u- Hx of sleeve: 2024     Genevieve Castillo is a 39 y.o. female status post laparoscopic sleeve gastrectomy performed on 2024.    Here for MWL follow up. Phentermine month #4. Doing well, denies SE. -6 lbs since last visit. Total 22 lbs since starting medication. Noted increased physical activity and energy.     See HPI and nurse note for additional subjective information.    Last Surgical Weight Loss:      2025     9:25 AM   Surgical Weight Loss Tracker   Visit Type  Follow Up Visit   Height 5' 5\"   Initial Weight 338 lb   Initial BMI 56.2   Ideal Body Weight 134 lb   Initial Excess Body Weight (EBW) 204 lb   Surgery Date 2024   Date 2025   Weight 262 lb 9.6 oz   BMI 43.7   Wt Change Since Last Visit 0 lb   Total Wt Change Since Surgery 0 lb   % EBWL <UNK>     Comorbidities:  Hypertension: resolved  Diabetes: improved A1c 5.7, Ozempic 1 mg dose  Obstructive Sleep Apnea: not applicable  Hyperlipidemia: resolved  Gastroesophageal Reflux: resolved      Past Medical History:   Diagnosis Date    Arthritic-like pain     Infertility associated with anovulation     In the past    Morbid obesity (HCC)     Morbid obesity with body mass index of 50.0-59.9 in adult (HCC)     Overactive bladder     Type 2 diabetes mellitus without complication (HCC)     Uses birth control      Past Surgical History:   Procedure Laterality Date     SECTION      x2 via pf    HEENT      wisdom teeth extracted    ORTHOPEDIC SURGERY Bilateral     meniscus and ACl repairs    SLEEVE GASTRECTOMY N/A 2024    ROBOT ASSISTED LAPAROSCOPIC SLEEVE GASTRECTOMY performed by Kenji Cooper MD at [facility] MAIN OR     Current Outpatient Medications   Medication Sig Dispense Refill    OZEMPIC, 1 MG/DOSE, 4 MG/3ML SOPN sc injection inject 1mg under the skin EVERY 7 DAYS FOR diabetes/weight management

## 2025-07-18 DIAGNOSIS — Z76.89 ENCOUNTER FOR WEIGHT MANAGEMENT: ICD-10-CM

## 2025-07-18 DIAGNOSIS — Z79.899 FOLLOW-UP ENCOUNTER INVOLVING MEDICATION: ICD-10-CM

## 2025-07-18 DIAGNOSIS — Z90.3 S/P GASTRIC SLEEVE PROCEDURE: ICD-10-CM

## 2025-07-18 DIAGNOSIS — E11.65 TYPE 2 DIABETES MELLITUS WITH HYPERGLYCEMIA, WITHOUT LONG-TERM CURRENT USE OF INSULIN (HCC): ICD-10-CM

## 2025-07-18 DIAGNOSIS — E66.01 MORBID OBESITY (HCC): ICD-10-CM

## 2025-07-18 RX ORDER — TOPIRAMATE 25 MG/1
TABLET, FILM COATED ORAL
Qty: 60 TABLET | Refills: 1 | Status: SHIPPED | OUTPATIENT
Start: 2025-07-18

## 2025-08-29 ENCOUNTER — OFFICE VISIT (OUTPATIENT)
Age: 40
End: 2025-08-29
Payer: COMMERCIAL

## 2025-08-29 VITALS
OXYGEN SATURATION: 95 % | WEIGHT: 258.3 LBS | SYSTOLIC BLOOD PRESSURE: 118 MMHG | BODY MASS INDEX: 43.03 KG/M2 | HEART RATE: 76 BPM | HEIGHT: 65 IN | DIASTOLIC BLOOD PRESSURE: 85 MMHG

## 2025-08-29 DIAGNOSIS — E66.01 MORBID OBESITY (HCC): ICD-10-CM

## 2025-08-29 DIAGNOSIS — Z90.3 S/P GASTRIC SLEEVE PROCEDURE: ICD-10-CM

## 2025-08-29 DIAGNOSIS — Z76.89 ENCOUNTER FOR WEIGHT MANAGEMENT: ICD-10-CM

## 2025-08-29 DIAGNOSIS — Z98.84 GENERALIZED EXCESS AND REDUNDANT SKIN AFTER BARIATRIC SURGERY: ICD-10-CM

## 2025-08-29 DIAGNOSIS — E11.65 TYPE 2 DIABETES MELLITUS WITH HYPERGLYCEMIA, WITHOUT LONG-TERM CURRENT USE OF INSULIN (HCC): ICD-10-CM

## 2025-08-29 DIAGNOSIS — L98.7 GENERALIZED EXCESS AND REDUNDANT SKIN AFTER BARIATRIC SURGERY: ICD-10-CM

## 2025-08-29 DIAGNOSIS — Z79.899 FOLLOW-UP ENCOUNTER INVOLVING MEDICATION: Primary | ICD-10-CM

## 2025-08-29 PROCEDURE — 99214 OFFICE O/P EST MOD 30 MIN: CPT | Performed by: REGISTERED NURSE

## 2025-08-29 PROCEDURE — 3044F HG A1C LEVEL LT 7.0%: CPT | Performed by: REGISTERED NURSE

## 2025-08-29 RX ORDER — DIETHYLPROPION HYDROCHLORIDE 75 MG/1
75 TABLET, EXTENDED RELEASE ORAL DAILY
Qty: 30 TABLET | Refills: 1 | Status: SHIPPED | OUTPATIENT
Start: 2025-08-29 | End: 2025-10-28

## 2025-08-29 ASSESSMENT — ENCOUNTER SYMPTOMS
GASTROINTESTINAL NEGATIVE: 1
SHORTNESS OF BREATH: 0
CHEST TIGHTNESS: 0

## (undated) DEVICE — NEEDLE SPNL 20GA L3.5IN YEL HUB S STL REG WALL FIT STYL

## (undated) DEVICE — SYRINGE MED 30ML STD CLR PLAS LUERLOCK TIP N CTRL DISP

## (undated) DEVICE — 3M™ STERI-DRAPE™ INSTRUMENT POUCH 1018L: Brand: STERI-DRAPE™

## (undated) DEVICE — KIT SUTURING DEVICE M-CLOSE

## (undated) DEVICE — SUTURE ETHIBOND EXCEL SZ 2-0 L30IN NONABSORBABLE GRN L26MM SH X833H

## (undated) DEVICE — STAPLER 60 RELOAD BLUE: Brand: SUREFORM

## (undated) DEVICE — TROCARS: Brand: KII® OPTICAL ACCESS SYSTEM

## (undated) DEVICE — AIRSEAL BIFURCATED SMOKE EVAC FILTERED TUBE SET: Brand: AIRSEAL

## (undated) DEVICE — APPLICATOR MEDICATED 26 CC SOLUTION HI LT ORNG CHLORAPREP

## (undated) DEVICE — Device

## (undated) DEVICE — BANDAGE,GAUZE,BULKEE II,4.5"X4.1YD,STRL: Brand: MEDLINE

## (undated) DEVICE — BLADELESS OBTURATOR: Brand: WECK VISTA

## (undated) DEVICE — STAPLER 60: Brand: SUREFORM

## (undated) DEVICE — SYRINGE MED 10ML LUERLOCK TIP W/O SFTY DISP

## (undated) DEVICE — INTENDED FOR TISSUE SEPARATION, AND OTHER PROCEDURES THAT REQUIRE A SHARP SURGICAL BLADE TO PUNCTURE OR CUT.: Brand: BARD-PARKER ® STAINLESS STEEL BLADES

## (undated) DEVICE — COLUMN DRAPE

## (undated) DEVICE — ARM DRAPE

## (undated) DEVICE — DECANTER FLD 9IN ST BG FOR ASEP TRNSF OF FLD

## (undated) DEVICE — GOWN,PREVENTION PLUS,XLN/XL,ST,24/CS: Brand: MEDLINE

## (undated) DEVICE — STAPLER 60 RELOAD WHITE: Brand: SUREFORM

## (undated) DEVICE — INSUFFLATION NEEDLE TO ESTABLISH PNEUMOPERITONEUM.: Brand: INSUFFLATION NEEDLE

## (undated) DEVICE — APPLICATOR LAP 45CM FLX 2 VISTASEAL

## (undated) DEVICE — VISIGI 3D®  CALIBRATION SYSTEM  SIZE 40FR STD W/ BULB: Brand: BOEHRINGER® VISIGI 3D™ SLEEVE GASTRECTOMY CALIBRATION SYSTEM, SIZE 40FR W/BULB

## (undated) DEVICE — SEALANT TISS 10 ML FIBRIN VISTASEAL

## (undated) DEVICE — DRAPE TOWEL: Brand: CONVERTORS

## (undated) DEVICE — KIT OR TURNOVER

## (undated) DEVICE — VESSEL SEALER EXTEND: Brand: ENDOWRIST

## (undated) DEVICE — REDUCER: Brand: ENDOWRIST

## (undated) DEVICE — STERILE POLYISOPRENE POWDER-FREE SURGICAL GLOVES: Brand: PROTEXIS

## (undated) DEVICE — SUTURE MONOCRYL SZ 4-0 L27IN ABSRB UD L24MM PS-1 3/8 CIR PRIM Y935H

## (undated) DEVICE — SOLUTION IRRIG 1000ML 0.9% SOD CHL USP POUR PLAS BTL

## (undated) DEVICE — SOLUTION ANTIFOG VIS SYS CLEARIFY LAPSCP

## (undated) DEVICE — SEAL

## (undated) DEVICE — ELECTRODE PT RET AD L9FT HI MOIST COND ADH HYDRGEL CORDED

## (undated) DEVICE — LIQUIBAND RAPID ADHESIVE 36/CS 0.8ML: Brand: MEDLINE

## (undated) DEVICE — TISSUE RETRIEVAL SYSTEM: Brand: INZII RETRIEVAL SYSTEM

## (undated) DEVICE — TAPE,CLOTH/SILK,CURAD,3"X10YD,LF,40/CS: Brand: CURAD

## (undated) DEVICE — KIT,ANTI FOG,W/SPONGE & FLUID,SOFT PACK: Brand: MEDLINE